# Patient Record
Sex: MALE | Race: WHITE | NOT HISPANIC OR LATINO | Employment: OTHER | URBAN - METROPOLITAN AREA
[De-identification: names, ages, dates, MRNs, and addresses within clinical notes are randomized per-mention and may not be internally consistent; named-entity substitution may affect disease eponyms.]

---

## 2017-01-04 ENCOUNTER — CLINICAL SUPPORT (OUTPATIENT)
Dept: FAMILY MEDICINE CLINIC | Facility: CLINIC | Age: 62
End: 2017-01-04

## 2017-01-04 DIAGNOSIS — Z51.6 DESENSITIZATION TO ALLERGENS: ICD-10-CM

## 2017-01-04 DIAGNOSIS — Z51.6 DESENSITIZATION TO ALLERGENS: Primary | ICD-10-CM

## 2017-01-04 PROCEDURE — 95117 IMMUNOTHERAPY INJECTIONS: CPT | Performed by: FAMILY MEDICINE

## 2017-01-19 ENCOUNTER — CLINICAL SUPPORT (OUTPATIENT)
Dept: FAMILY MEDICINE CLINIC | Facility: CLINIC | Age: 62
End: 2017-01-19

## 2017-01-19 DIAGNOSIS — Z51.6 NEED FOR DESENSITIZATION TO ALLERGENS: Primary | ICD-10-CM

## 2017-01-19 PROCEDURE — 95117 IMMUNOTHERAPY INJECTIONS: CPT | Performed by: FAMILY MEDICINE

## 2017-01-26 ENCOUNTER — CLINICAL SUPPORT (OUTPATIENT)
Dept: FAMILY MEDICINE CLINIC | Facility: CLINIC | Age: 62
End: 2017-01-26

## 2017-01-26 DIAGNOSIS — Z51.6 ALLERGY DESENSITIZATION THERAPY: ICD-10-CM

## 2017-01-26 DIAGNOSIS — Z51.6 ALLERGY DESENSITIZATION THERAPY: Primary | ICD-10-CM

## 2017-01-26 PROCEDURE — 95117 IMMUNOTHERAPY INJECTIONS: CPT | Performed by: FAMILY MEDICINE

## 2017-02-01 ENCOUNTER — TRANSCRIBE ORDERS (OUTPATIENT)
Dept: PULMONOLOGY | Facility: CLINIC | Age: 62
End: 2017-02-01

## 2017-02-01 DIAGNOSIS — R06.02 SOB (SHORTNESS OF BREATH): Primary | ICD-10-CM

## 2017-02-01 DIAGNOSIS — J84.10 PULMONARY FIBROSIS (HCC): Primary | ICD-10-CM

## 2017-02-07 ENCOUNTER — CLINICAL SUPPORT (OUTPATIENT)
Dept: FAMILY MEDICINE CLINIC | Facility: CLINIC | Age: 62
End: 2017-02-07

## 2017-02-07 DIAGNOSIS — Z51.6 DESENSITIZATION TO ALLERGENS: Primary | ICD-10-CM

## 2017-02-07 DIAGNOSIS — Z51.6 DESENSITIZATION TO ALLERGENS: ICD-10-CM

## 2017-02-07 PROCEDURE — 95117 IMMUNOTHERAPY INJECTIONS: CPT | Performed by: FAMILY MEDICINE

## 2017-02-14 ENCOUNTER — CLINICAL SUPPORT (OUTPATIENT)
Dept: FAMILY MEDICINE CLINIC | Facility: CLINIC | Age: 62
End: 2017-02-14

## 2017-02-14 DIAGNOSIS — Z51.6 NEED FOR DESENSITIZATION TO ALLERGENS: Primary | ICD-10-CM

## 2017-02-14 PROCEDURE — 95117 IMMUNOTHERAPY INJECTIONS: CPT | Performed by: FAMILY MEDICINE

## 2017-02-21 ENCOUNTER — CLINICAL SUPPORT (OUTPATIENT)
Dept: FAMILY MEDICINE CLINIC | Facility: CLINIC | Age: 62
End: 2017-02-21

## 2017-02-21 DIAGNOSIS — Z51.6 NEED FOR DESENSITIZATION TO ALLERGENS: Primary | ICD-10-CM

## 2017-02-21 PROCEDURE — 95117 IMMUNOTHERAPY INJECTIONS: CPT | Performed by: FAMILY MEDICINE

## 2017-02-27 PROBLEM — E55.9 VITAMIN D DEFICIENCY: Status: ACTIVE | Noted: 2017-02-27

## 2017-03-01 ENCOUNTER — CLINICAL SUPPORT (OUTPATIENT)
Dept: FAMILY MEDICINE CLINIC | Facility: CLINIC | Age: 62
End: 2017-03-01

## 2017-03-01 DIAGNOSIS — Z51.6 NEED FOR DESENSITIZATION TO ALLERGENS: Primary | ICD-10-CM

## 2017-03-01 PROCEDURE — 95117 IMMUNOTHERAPY INJECTIONS: CPT | Performed by: FAMILY MEDICINE

## 2017-03-07 ENCOUNTER — CLINICAL SUPPORT (OUTPATIENT)
Dept: FAMILY MEDICINE CLINIC | Facility: CLINIC | Age: 62
End: 2017-03-07

## 2017-03-07 DIAGNOSIS — Z51.6 NEED FOR DESENSITIZATION TO ALLERGENS: Primary | ICD-10-CM

## 2017-03-07 PROCEDURE — 95117 IMMUNOTHERAPY INJECTIONS: CPT | Performed by: FAMILY MEDICINE

## 2017-03-14 ENCOUNTER — HOSPITAL ENCOUNTER (OUTPATIENT)
Dept: GENERAL RADIOLOGY | Facility: HOSPITAL | Age: 62
Discharge: HOME | End: 2017-03-14
Attending: INTERNAL MEDICINE

## 2017-03-14 ENCOUNTER — TRANSCRIBE ORDERS (OUTPATIENT)
Dept: PULMONOLOGY | Facility: CLINIC | Age: 62
End: 2017-03-14

## 2017-03-14 ENCOUNTER — HOSPITAL ENCOUNTER (OUTPATIENT)
Dept: RESPIRATORY THERAPY | Facility: HOSPITAL | Age: 62
Discharge: HOME | End: 2017-03-14
Attending: INTERNAL MEDICINE | Admitting: INTERNAL MEDICINE

## 2017-03-14 ENCOUNTER — OFFICE VISIT (OUTPATIENT)
Dept: PULMONOLOGY | Facility: CLINIC | Age: 62
End: 2017-03-14

## 2017-03-14 VITALS
OXYGEN SATURATION: 97 % | WEIGHT: 271 LBS | HEART RATE: 88 BPM | BODY MASS INDEX: 32 KG/M2 | HEIGHT: 77 IN | SYSTOLIC BLOOD PRESSURE: 142 MMHG | TEMPERATURE: 97.9 F | DIASTOLIC BLOOD PRESSURE: 85 MMHG

## 2017-03-14 DIAGNOSIS — J84.112 IDIOPATHIC PULMONARY FIBROSIS (HCC): ICD-10-CM

## 2017-03-14 DIAGNOSIS — J84.10 PULMONARY FIBROSIS (HCC): ICD-10-CM

## 2017-03-14 DIAGNOSIS — J84.10 PULMONARY FIBROSIS (HCC): Primary | ICD-10-CM

## 2017-03-14 DIAGNOSIS — I73.00 RAYNAUD'S DISEASE WITHOUT GANGRENE: ICD-10-CM

## 2017-03-14 DIAGNOSIS — J84.112 IDIOPATHIC PULMONARY FIBROSIS (HCC): Primary | ICD-10-CM

## 2017-03-14 PROCEDURE — 94727 GAS DIL/WSHOT DETER LNG VOL: CPT | Performed by: INTERNAL MEDICINE

## 2017-03-14 PROCEDURE — 94727 GAS DIL/WSHOT DETER LNG VOL: CPT

## 2017-03-14 PROCEDURE — 94010 BREATHING CAPACITY TEST: CPT

## 2017-03-14 PROCEDURE — 71020 HC CHEST PA AND LATERAL: CPT

## 2017-03-14 PROCEDURE — 99213 OFFICE O/P EST LOW 20 MIN: CPT | Performed by: INTERNAL MEDICINE

## 2017-03-14 PROCEDURE — 94729 DIFFUSING CAPACITY: CPT

## 2017-03-14 PROCEDURE — 94010 BREATHING CAPACITY TEST: CPT | Performed by: INTERNAL MEDICINE

## 2017-03-14 PROCEDURE — 94729 DIFFUSING CAPACITY: CPT | Performed by: INTERNAL MEDICINE

## 2017-03-14 PROCEDURE — 71020 XR CHEST PA AND LATERAL: CPT | Performed by: RADIOLOGY

## 2017-03-14 NOTE — PROGRESS NOTES
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  He continues to work full-time and has some other minor medical problems of hypertension and acid reflux etc. being treated by his local primary care physicians      Review of Systems minimal shortness of breath and cough no expectoration chest pain or weight loss    Active Problems:  Problem List Items Addressed This Visit        Respiratory    Pulmonary fibrosis - Primary    Overview     Annotation: 04/03/2016 - Sees Dr. Kumar ([place]) - lung bx 2010; Prednisone until 9/15;                Past Medical History:  Past Medical History   Diagnosis Date   • Allergic rhinitis    • Asthma    • Elevated WBC count    • Hyperlipidemia    • Hypertension    • Idiopathic pulmonary fibrosis    • Palpitations    • Polyp of sigmoid colon    • Raynaud's disease    • Tinea cruris    • Type 2 diabetes mellitus        Family History:  Family History   Problem Relation Age of Onset   • Lymphoma Mother    • Coronary artery disease Father    • Diabetes Father    • Lung cancer Father        Social History:  Social History   Substance Use Topics   • Smoking status: Never Smoker   • Smokeless tobacco: Never Used   • Alcohol use 1.2 oz/week     2 Cans of beer per week      Comment: monthly?  "      Current Medications:  Current Outpatient Prescriptions   Medication Sig Dispense Refill   • Acetylcysteine (NAC PO) Take  by mouth daily.     • amLODIPine (NORVASC) 10 MG tablet Take 1 tablet by mouth daily. 90 tablet 3   • aspirin 81 MG tablet Take 1 tablet by mouth daily.     • Azelastine-Fluticasone 137-50 MCG/ACT suspension 1 spray into each nostril 2 (two) times a day.     • Cholecalciferol (VITAMIN D3) 5000 UNITS capsule capsule Take 5,000 Units by mouth daily.     • ibuprofen (ADVIL,MOTRIN) 200 MG tablet Take 2 tablets by mouth daily.     • losartan-hydrochlorothiazide (HYZAAR) 100-12.5 MG per tablet Take 1 tablet by mouth daily. 90 tablet 3   • Misc Natural Products (GLUCOSAMINE CHOND COMPLEX/MSM) tablet Take  by mouth.     • montelukast (SINGULAIR) 10 MG tablet Take 1 tablet by mouth daily. 90 tablet 3   • Multiple Vitamin (MULTI VITAMIN DAILY PO) Take 1 tablet by mouth daily. Kemar green     • Nintedanib Esylate 150 MG capsule Take 1 tablet by mouth 2 (two) times a day.     • Saw Palmetto extract      • simvastatin (ZOCOR) 20 MG tablet Take 1 tablet by mouth Every Night. 90 tablet 1   • Milk Thistle 175 MG capsule Take  by mouth.       No current facility-administered medications for this visit.        Allergies:  No Known Allergies    Vitals:  Visit Vitals   • /85 (BP Location: Left arm, Patient Position: Sitting, Cuff Size: Adult)   • Pulse 88   • Temp 97.9 °F (36.6 °C) (Oral)   • Ht 77\" (195.6 cm)   • Wt 271 lb (123 kg)   • SpO2 97%   • BMI 32.14 kg/m2       Physical Exam no acute distress vital signs is stable O2 sat 97% on room air blood pressure 140/85 positive findings are limited to clubbing of the fingers no cyanosis heart is regular and chest exam is significant for basal crackling rales at the end of inspiration that she had for several years    Imaging: Chest x-ray shows reduced lung volumes and some basal interstitial Infiltrate and changed over the years    PFT: FVC 36 FEV1 30 " total lung capacity 58 and diffusion capacity 78% similar to what she had in October and the numbers in July 2015 or 30 3/27/52 and 94% respectively  Results for orders placed or performed in visit on 11/17/16   Comprehensive Metabolic Panel   Result Value Ref Range    Glucose 97 70 - 100 mg/dL    BUN 20 9 - 23 mg/dL    Creatinine 0.90 0.60 - 1.30 mg/dL    eGFR Non African Am 86 >60 mL/min/1.73    eGFR African Am 105 >60 mL/min/1.73    BUN/Creatinine Ratio 22.2 7.0 - 25.0    Sodium 140 132 - 146 mmol/L    Potassium 4.7 3.5 - 5.5 mmol/L    Chloride 103 99 - 109 mmol/L    Total CO2 31.0 20.0 - 31.0 mmol/L    Calcium 9.5 8.7 - 10.4 mg/dL    Total Protein 7.3 5.7 - 8.2 g/dL    Albumin 4.40 3.20 - 4.80 g/dL    Globulin 2.9 gm/dL    A/G Ratio 1.5 g/dL    Total Bilirubin 0.7 0.3 - 1.2 mg/dL    Alkaline Phosphatase 73 25 - 100 U/L    AST (SGOT) 41 (H) 0 - 33 U/L    ALT (SGPT) 62 (H) 7 - 40 U/L   Lipid Panel   Result Value Ref Range    Total Cholesterol 199 0 - 200 mg/dL    Triglycerides 105 0 - 150 mg/dL    HDL Cholesterol 46 40 - 60 mg/dL    VLDL Cholesterol 21 mg/dL    LDL Cholesterol  132 (H) 0 - 100 mg/dL   Vitamin D 25 Hydroxy   Result Value Ref Range    25 Hydroxy, Vitamin D 37.3 ng/mL           ASSESSMENT AND DIAGNOSIS: Cirrhosis pulmonary fibrosis biopsy proven October 2009 remaining relatively stable based on symptoms and pulmonary function test  Recommendation continue current regimen return to us in 4 months with a complete PFT or earlier as needed      Follow-Up: 4 months with a complete PFT

## 2017-03-15 ENCOUNTER — CLINICAL SUPPORT (OUTPATIENT)
Dept: FAMILY MEDICINE CLINIC | Facility: CLINIC | Age: 62
End: 2017-03-15

## 2017-03-15 DIAGNOSIS — Z51.6 ALLERGY DESENSITIZATION THERAPY: ICD-10-CM

## 2017-03-15 DIAGNOSIS — Z51.6 ALLERGY DESENSITIZATION THERAPY: Primary | ICD-10-CM

## 2017-03-15 PROCEDURE — 95117 IMMUNOTHERAPY INJECTIONS: CPT | Performed by: FAMILY MEDICINE

## 2017-03-17 ENCOUNTER — APPOINTMENT (OUTPATIENT)
Dept: RESPIRATORY THERAPY | Facility: HOSPITAL | Age: 62
End: 2017-03-17
Attending: INTERNAL MEDICINE

## 2017-03-20 ENCOUNTER — CLINICAL SUPPORT (OUTPATIENT)
Dept: FAMILY MEDICINE CLINIC | Facility: CLINIC | Age: 62
End: 2017-03-20

## 2017-03-20 DIAGNOSIS — Z51.6 DESENSITIZATION TO ALLERGENS: Primary | ICD-10-CM

## 2017-03-20 DIAGNOSIS — Z51.6 DESENSITIZATION TO ALLERGENS: ICD-10-CM

## 2017-03-20 PROCEDURE — 95117 IMMUNOTHERAPY INJECTIONS: CPT | Performed by: FAMILY MEDICINE

## 2017-03-28 ENCOUNTER — CLINICAL SUPPORT (OUTPATIENT)
Dept: FAMILY MEDICINE CLINIC | Facility: CLINIC | Age: 62
End: 2017-03-28

## 2017-03-28 DIAGNOSIS — Z51.6 NEED FOR DESENSITIZATION TO ALLERGENS: Primary | ICD-10-CM

## 2017-03-28 PROCEDURE — 95117 IMMUNOTHERAPY INJECTIONS: CPT | Performed by: FAMILY MEDICINE

## 2017-04-04 ENCOUNTER — CLINICAL SUPPORT (OUTPATIENT)
Dept: FAMILY MEDICINE CLINIC | Facility: CLINIC | Age: 62
End: 2017-04-04

## 2017-04-04 DIAGNOSIS — Z51.6 NEED FOR DESENSITIZATION TO ALLERGENS: Primary | ICD-10-CM

## 2017-04-04 PROCEDURE — 95117 IMMUNOTHERAPY INJECTIONS: CPT | Performed by: FAMILY MEDICINE

## 2017-04-12 ENCOUNTER — CLINICAL SUPPORT (OUTPATIENT)
Dept: FAMILY MEDICINE CLINIC | Facility: CLINIC | Age: 62
End: 2017-04-12

## 2017-04-12 DIAGNOSIS — Z51.6 NEED FOR DESENSITIZATION TO ALLERGENS: Primary | ICD-10-CM

## 2017-04-12 DIAGNOSIS — Z51.6 NEED FOR DESENSITIZATION TO ALLERGENS: ICD-10-CM

## 2017-04-12 PROCEDURE — 95117 IMMUNOTHERAPY INJECTIONS: CPT | Performed by: FAMILY MEDICINE

## 2017-04-20 ENCOUNTER — CLINICAL SUPPORT (OUTPATIENT)
Dept: FAMILY MEDICINE CLINIC | Facility: CLINIC | Age: 62
End: 2017-04-20

## 2017-04-20 DIAGNOSIS — Z51.6 NEED FOR DESENSITIZATION TO ALLERGENS: Primary | ICD-10-CM

## 2017-04-20 PROCEDURE — 95117 IMMUNOTHERAPY INJECTIONS: CPT | Performed by: FAMILY MEDICINE

## 2017-04-26 ENCOUNTER — CLINICAL SUPPORT (OUTPATIENT)
Dept: FAMILY MEDICINE CLINIC | Facility: CLINIC | Age: 62
End: 2017-04-26

## 2017-04-26 DIAGNOSIS — R79.89 ELEVATED LFTS: ICD-10-CM

## 2017-04-26 DIAGNOSIS — Z51.6 DESENSITIZATION TO ALLERGENS: ICD-10-CM

## 2017-04-26 DIAGNOSIS — R79.89 ELEVATED LFTS: Primary | ICD-10-CM

## 2017-04-26 DIAGNOSIS — Z51.6 DESENSITIZATION TO ALLERGENS: Primary | ICD-10-CM

## 2017-04-26 DIAGNOSIS — E78.5 HYPERLIPIDEMIA, UNSPECIFIED HYPERLIPIDEMIA TYPE: ICD-10-CM

## 2017-04-26 LAB
ALBUMIN SERPL-MCNC: 4.2 G/DL (ref 3.2–4.8)
ALBUMIN/GLOB SERPL: 1.5 G/DL (ref 1.5–2.5)
ALP SERPL-CCNC: 66 U/L (ref 25–100)
ALT SERPL-CCNC: 51 U/L (ref 7–40)
AST SERPL-CCNC: 45 U/L (ref 0–33)
BILIRUB SERPL-MCNC: 0.5 MG/DL (ref 0.3–1.2)
BUN SERPL-MCNC: 18 MG/DL (ref 9–23)
BUN/CREAT SERPL: 22.5 (ref 7–25)
CALCIUM SERPL-MCNC: 9.6 MG/DL (ref 8.7–10.4)
CHLORIDE SERPL-SCNC: 99 MMOL/L (ref 99–109)
CHOLEST SERPL-MCNC: 150 MG/DL (ref 0–200)
CO2 SERPL-SCNC: 34 MMOL/L (ref 20–31)
CREAT SERPL-MCNC: 0.8 MG/DL (ref 0.6–1.3)
GLOBULIN SER CALC-MCNC: 2.8 GM/DL
GLUCOSE SERPL-MCNC: 101 MG/DL (ref 70–100)
HDLC SERPL-MCNC: 43 MG/DL (ref 40–60)
LDLC SERPL CALC-MCNC: 85 MG/DL (ref 0–100)
POTASSIUM SERPL-SCNC: 5.1 MMOL/L (ref 3.5–5.5)
PROT SERPL-MCNC: 7 G/DL (ref 5.7–8.2)
SODIUM SERPL-SCNC: 138 MMOL/L (ref 132–146)
TRIGL SERPL-MCNC: 110 MG/DL (ref 0–150)
VLDLC SERPL CALC-MCNC: 22 MG/DL

## 2017-04-26 PROCEDURE — 95117 IMMUNOTHERAPY INJECTIONS: CPT | Performed by: FAMILY MEDICINE

## 2017-04-27 LAB
HBA1C MFR BLD: NORMAL %
Lab: NORMAL
REQUEST PROBLEM: NORMAL
WRITTEN AUTHORIZATION: NORMAL

## 2017-05-04 ENCOUNTER — OFFICE VISIT (OUTPATIENT)
Dept: FAMILY MEDICINE CLINIC | Facility: CLINIC | Age: 62
End: 2017-05-04

## 2017-05-04 VITALS
DIASTOLIC BLOOD PRESSURE: 80 MMHG | RESPIRATION RATE: 16 BRPM | TEMPERATURE: 98.6 F | BODY MASS INDEX: 32 KG/M2 | HEART RATE: 68 BPM | SYSTOLIC BLOOD PRESSURE: 130 MMHG | WEIGHT: 271 LBS | HEIGHT: 77 IN

## 2017-05-04 DIAGNOSIS — E11.9 TYPE 2 DIABETES MELLITUS WITHOUT COMPLICATION, WITHOUT LONG-TERM CURRENT USE OF INSULIN (HCC): ICD-10-CM

## 2017-05-04 DIAGNOSIS — E78.5 HYPERLIPIDEMIA, UNSPECIFIED HYPERLIPIDEMIA TYPE: ICD-10-CM

## 2017-05-04 DIAGNOSIS — Z11.59 NEED FOR HEPATITIS C SCREENING TEST: ICD-10-CM

## 2017-05-04 DIAGNOSIS — E78.2 MIXED HYPERLIPIDEMIA: ICD-10-CM

## 2017-05-04 DIAGNOSIS — I10 ESSENTIAL HYPERTENSION: Primary | ICD-10-CM

## 2017-05-04 DIAGNOSIS — J84.10 PULMONARY FIBROSIS (HCC): ICD-10-CM

## 2017-05-04 DIAGNOSIS — Z51.6 ALLERGY DESENSITIZATION THERAPY: Primary | ICD-10-CM

## 2017-05-04 DIAGNOSIS — J30.1 ALLERGIC RHINITIS DUE TO POLLEN, UNSPECIFIED RHINITIS SEASONALITY: ICD-10-CM

## 2017-05-04 PROCEDURE — 99214 OFFICE O/P EST MOD 30 MIN: CPT | Performed by: FAMILY MEDICINE

## 2017-05-04 PROCEDURE — 95117 IMMUNOTHERAPY INJECTIONS: CPT | Performed by: FAMILY MEDICINE

## 2017-05-04 RX ORDER — AMLODIPINE BESYLATE 10 MG/1
10 TABLET ORAL DAILY
Qty: 90 TABLET | Refills: 3 | Status: SHIPPED | OUTPATIENT
Start: 2017-05-04 | End: 2017-11-06 | Stop reason: SDUPTHER

## 2017-05-04 RX ORDER — SIMVASTATIN 20 MG
20 TABLET ORAL NIGHTLY
Qty: 90 TABLET | Refills: 3 | Status: SHIPPED | OUTPATIENT
Start: 2017-05-04 | End: 2017-11-06 | Stop reason: SDUPTHER

## 2017-05-04 RX ORDER — MONTELUKAST SODIUM 10 MG/1
10 TABLET ORAL DAILY
Qty: 90 TABLET | Refills: 3 | Status: SHIPPED | OUTPATIENT
Start: 2017-05-04 | End: 2017-11-06 | Stop reason: SDUPTHER

## 2017-05-04 RX ORDER — LOSARTAN POTASSIUM AND HYDROCHLOROTHIAZIDE 12.5; 1 MG/1; MG/1
1 TABLET ORAL DAILY
Qty: 90 TABLET | Refills: 3 | Status: SHIPPED | OUTPATIENT
Start: 2017-05-04 | End: 2017-11-06 | Stop reason: SDUPTHER

## 2017-05-04 RX ORDER — AZELASTINE HYDROCHLORIDE, FLUTICASONE PROPIONATE 137; 50 UG/1; UG/1
1 SPRAY, METERED NASAL 2 TIMES DAILY
Qty: 23 G | Refills: 11 | Status: SHIPPED | OUTPATIENT
Start: 2017-05-04 | End: 2019-10-14 | Stop reason: ALTCHOICE

## 2017-05-05 LAB
HBA1C MFR BLD: 6.5 % (ref 4.8–5.6)
HCV AB S/CO SERPL IA: <0.1 S/CO RATIO (ref 0–0.9)
MICROALBUMIN UR-MCNC: <3 UG/ML

## 2017-05-12 ENCOUNTER — CLINICAL SUPPORT (OUTPATIENT)
Dept: FAMILY MEDICINE CLINIC | Facility: CLINIC | Age: 62
End: 2017-05-12

## 2017-05-12 DIAGNOSIS — Z51.6 NEED FOR DESENSITIZATION TO ALLERGENS: Primary | ICD-10-CM

## 2017-05-12 PROCEDURE — 95117 IMMUNOTHERAPY INJECTIONS: CPT | Performed by: FAMILY MEDICINE

## 2017-05-15 ENCOUNTER — TELEPHONE (OUTPATIENT)
Dept: FAMILY MEDICINE CLINIC | Facility: CLINIC | Age: 62
End: 2017-05-15

## 2017-05-15 RX ORDER — PERMETHRIN 50 MG/G
CREAM TOPICAL
Qty: 60 G | Refills: 0 | Status: SHIPPED | OUTPATIENT
Start: 2017-05-15 | End: 2017-11-06

## 2017-05-25 ENCOUNTER — CLINICAL SUPPORT (OUTPATIENT)
Dept: FAMILY MEDICINE CLINIC | Facility: CLINIC | Age: 62
End: 2017-05-25

## 2017-05-25 DIAGNOSIS — Z51.6 NEED FOR DESENSITIZATION TO ALLERGENS: Primary | ICD-10-CM

## 2017-05-25 PROCEDURE — 95117 IMMUNOTHERAPY INJECTIONS: CPT | Performed by: FAMILY MEDICINE

## 2017-06-01 ENCOUNTER — CLINICAL SUPPORT (OUTPATIENT)
Dept: FAMILY MEDICINE CLINIC | Facility: CLINIC | Age: 62
End: 2017-06-01

## 2017-06-01 DIAGNOSIS — Z51.6 NEED FOR DESENSITIZATION TO ALLERGENS: Primary | ICD-10-CM

## 2017-06-01 PROCEDURE — 95117 IMMUNOTHERAPY INJECTIONS: CPT | Performed by: FAMILY MEDICINE

## 2017-06-08 ENCOUNTER — CLINICAL SUPPORT (OUTPATIENT)
Dept: FAMILY MEDICINE CLINIC | Facility: CLINIC | Age: 62
End: 2017-06-08

## 2017-06-08 DIAGNOSIS — Z51.6 NEED FOR DESENSITIZATION TO ALLERGENS: Primary | ICD-10-CM

## 2017-06-08 PROCEDURE — 95117 IMMUNOTHERAPY INJECTIONS: CPT | Performed by: FAMILY MEDICINE

## 2017-06-20 ENCOUNTER — OFFICE VISIT (OUTPATIENT)
Dept: PULMONOLOGY | Facility: CLINIC | Age: 62
End: 2017-06-20

## 2017-06-20 VITALS
WEIGHT: 271 LBS | HEIGHT: 77 IN | SYSTOLIC BLOOD PRESSURE: 140 MMHG | HEART RATE: 82 BPM | DIASTOLIC BLOOD PRESSURE: 82 MMHG | TEMPERATURE: 97.7 F | OXYGEN SATURATION: 99 % | BODY MASS INDEX: 32 KG/M2

## 2017-06-20 DIAGNOSIS — E84.9 CYSTIC FIBROSIS (HCC): Primary | ICD-10-CM

## 2017-06-20 PROCEDURE — 99213 OFFICE O/P EST LOW 20 MIN: CPT | Performed by: INTERNAL MEDICINE

## 2017-06-20 NOTE — PROGRESS NOTES
History of Present Illness 61-year-old gentleman returning for follow-up of pulmonary fibrosis is was diagnosed with a biopsy October and was originally Treated with a tapering dose of prednisone and maintained on prednisone for several years and has been OFEV  for the last couple of years.  He continues to work full-time at [employer] and has minimal cough and some dyspnea on exertion.  He Recently Made an appointment for lung transplant evaluation at  however he underwent 6 minute walk and his O2 sat dropped to 83% he was told that he needs oxygen and she is here to get a second opinion            Review of Systems minimal cough and increasing shortness of breath on exertion he Pulse oxygen at home that he Uses and he has documented this oxygen drops to 84,85% with Exercise and self walks    Active Problems:  Problem List Items Addressed This Visit        Other    Cystic fibrosis - Primary          Past Medical History:  Past Medical History:   Diagnosis Date   • Allergic rhinitis    • Asthma    • Elevated WBC count    • Hyperlipidemia    • Hypertension    • Idiopathic pulmonary fibrosis    • Palpitations    • Polyp of sigmoid colon    • Raynaud's disease    • Tinea cruris    • Type 2 diabetes mellitus        Family History:  Family History   Problem Relation Age of Onset   • Lymphoma Mother    • Coronary artery disease Father    • Diabetes Father    • Lung cancer Father        Social History:  Social History   Substance Use Topics   • Smoking status: Never Smoker   • Smokeless tobacco: Never Used   • Alcohol use 1.2 oz/week     2 Cans of beer per week      Comment: monthly?       Current Medications:  Current Outpatient Prescriptions   Medication Sig Dispense Refill   • Acetylcysteine (NAC PO) Take  by mouth daily.     • amLODIPine (NORVASC) 10 MG tablet Take 1 tablet by mouth Daily. 90 tablet 3   • aspirin 81 MG tablet Take 1 tablet by mouth daily.     • Azelastine-Fluticasone 137-50  "MCG/ACT suspension 1 spray into each nostril 2 (Two) Times a Day. 23 g 11   • Cholecalciferol (VITAMIN D3) 5000 UNITS capsule capsule Take 5,000 Units by mouth daily.     • ibuprofen (ADVIL,MOTRIN) 200 MG tablet Take 2 tablets by mouth daily.     • losartan-hydrochlorothiazide (HYZAAR) 100-12.5 MG per tablet Take 1 tablet by mouth Daily. 90 tablet 3   • Misc Natural Products (GLUCOSAMINE CHOND COMPLEX/MSM) tablet Take  by mouth.     • montelukast (SINGULAIR) 10 MG tablet Take 1 tablet by mouth Daily. 90 tablet 3   • Multiple Vitamin (MULTI VITAMIN DAILY PO) Take 1 tablet by mouth daily. Kemar green     • Nintedanib Esylate 150 MG capsule Take 1 tablet by mouth 2 (two) times a day.     • permethrin (ACTICIN) 5 % cream Apply cream from head to feet, leave on for 8-14 hours, wash with soap/water, repeat if living mites present 14 days after initial treatment 60 g 0   • Saw Palmetto extract      • simvastatin (ZOCOR) 20 MG tablet Take 1 tablet by mouth Every Night. 90 tablet 3     No current facility-administered medications for this visit.        Allergies:  No Known Allergies    Vitals:  /82 (BP Location: Left arm, Patient Position: Sitting, Cuff Size: Adult)  Pulse 82  Temp 97.7 °F (36.5 °C) (Oral)   Ht 77\" (195.6 cm)  Wt 271 lb (123 kg)  SpO2 99%  BMI 32.14 kg/m2    Physical Exam no acute distress vital signs are stable O2 sat 98% at rest her 6 minute walk dropped to 83% he has obvious clubbing and minimal basilar rales 7 some tachycardia on exertion with heart rate of 120 and no edema    Imaging:    PFT: No PFT today Last one was done in March and showed some restrictive impairment and decreased diffusion capacity  Results for orders placed or performed in visit on 05/04/17   Hemoglobin A1c   Result Value Ref Range    Hemoglobin A1C 6.50 (H) 4.80 - 5.60 %   MicroAlbumin, Urine, Random   Result Value Ref Range    Microalbumin, Urine <3.0 Not Estab. ug/mL   Hepatitis C Antibody   Result Value Ref Range    " Hep C Virus Ab <0.1 0.0 - 0.9 s/co ratio           ASSESSMENT AND DIAGNOSIS:Pulmonary Fibrosis desaturation on ambulation  Recommendation wrote a prescription for oxygen generator to use at night to him to record his O2 sat at night as well certainly needs to 2 L of oxygen on ambulation and TRY TO TITRATE IT  NEXT VISIT AND AMBULATION AND MAY INCREASE THAT TO OXYGEN ACCORDINGLY.  He has an appointment in early July smoker back to [facility] transplant clinic.  Continue OFEV 150 mg twice a day      Follow-Up: Return in September and a copy of a PFT From the [facility]

## 2017-06-23 ENCOUNTER — CLINICAL SUPPORT (OUTPATIENT)
Dept: FAMILY MEDICINE CLINIC | Facility: CLINIC | Age: 62
End: 2017-06-23

## 2017-06-23 DIAGNOSIS — Z51.6 NEED FOR DESENSITIZATION TO ALLERGENS: Primary | ICD-10-CM

## 2017-06-23 PROCEDURE — 95117 IMMUNOTHERAPY INJECTIONS: CPT | Performed by: FAMILY MEDICINE

## 2017-06-30 ENCOUNTER — CLINICAL SUPPORT (OUTPATIENT)
Dept: FAMILY MEDICINE CLINIC | Facility: CLINIC | Age: 62
End: 2017-06-30

## 2017-06-30 DIAGNOSIS — Z51.6 NEED FOR DESENSITIZATION TO ALLERGENS: Primary | ICD-10-CM

## 2017-06-30 PROCEDURE — 95117 IMMUNOTHERAPY INJECTIONS: CPT | Performed by: FAMILY MEDICINE

## 2017-07-07 ENCOUNTER — CLINICAL SUPPORT (OUTPATIENT)
Dept: FAMILY MEDICINE CLINIC | Facility: CLINIC | Age: 62
End: 2017-07-07

## 2017-07-07 DIAGNOSIS — Z51.6 NEED FOR DESENSITIZATION TO ALLERGENS: ICD-10-CM

## 2017-07-07 PROCEDURE — 95117 IMMUNOTHERAPY INJECTIONS: CPT | Performed by: FAMILY MEDICINE

## 2017-07-13 ENCOUNTER — CLINICAL SUPPORT (OUTPATIENT)
Dept: FAMILY MEDICINE CLINIC | Facility: CLINIC | Age: 62
End: 2017-07-13

## 2017-07-13 DIAGNOSIS — Z51.6 NEED FOR DESENSITIZATION TO ALLERGENS: Primary | ICD-10-CM

## 2017-07-13 PROCEDURE — 95117 IMMUNOTHERAPY INJECTIONS: CPT | Performed by: FAMILY MEDICINE

## 2017-08-01 ENCOUNTER — OFFICE VISIT (OUTPATIENT)
Dept: PULMONOLOGY | Facility: CLINIC | Age: 62
End: 2017-08-01

## 2017-08-01 DIAGNOSIS — J84.10 PULMONARY FIBROSIS (HCC): Primary | ICD-10-CM

## 2017-08-01 PROCEDURE — 99213 OFFICE O/P EST LOW 20 MIN: CPT | Performed by: INTERNAL MEDICINE

## 2017-08-01 RX ORDER — LORATADINE 10 MG/1
CAPSULE, LIQUID FILLED ORAL
COMMUNITY

## 2017-08-01 NOTE — PROGRESS NOTES
History of Present Illness 61-year-old gentleman returning for follow-up of pulmonary fibrosis his diagnosis was made in January 2010 with a lung biopsy and he has been followed in our office since March 2007 originally treated with a tapering dose of prednisone and for the last 2 years has been onOFEV 150 mg twice a day he has continued working get to the toe planned in [facility] until about a few month ago and that his disability Was approved because of the decreasing Lung Function, has been seen at [facility] program for a couple of times last time being July 10 2017 and chest x-ray and PFT.  He uses oxygen on the treadmill once use oxygenokay at rest and at night      Review of Systems minimal cough and mild dyspnea on exertion    Active Problems:  Problem List Items Addressed This Visit        Respiratory    Pulmonary fibrosis - Primary    Overview     Annotation: 04/03/2016 - Sees Dr. Kumar ([place]) - lung bx 2010; Prednisone until 9/15;                Past Medical History:  Past Medical History:   Diagnosis Date   • Allergic rhinitis    • Asthma    • Elevated WBC count    • Hyperlipidemia    • Hypertension    • Idiopathic pulmonary fibrosis    • Palpitations    • Polyp of sigmoid colon    • Raynaud's disease    • Tinea cruris    • Type 2 diabetes mellitus        Family History:  Family History   Problem Relation Age of Onset   • Lymphoma Mother    • Coronary artery disease Father    • Diabetes Father    • Lung cancer Father        Social History:  Social History   Substance Use Topics   • Smoking status: Never Smoker   • Smokeless tobacco: Never Used   • Alcohol use 1.2 oz/week     2 Cans of beer per week      Comment: monthly?       Current Medications:  Current Outpatient Prescriptions   Medication Sig Dispense Refill   • Acetylcysteine (NAC PO) Take  by mouth daily.     • amLODIPine (NORVASC) 10 MG tablet Take 1 tablet by mouth Daily. 90 tablet 3   • aspirin 81 MG tablet Take 1 tablet by  mouth daily.     • Azelastine-Fluticasone 137-50 MCG/ACT suspension 1 spray into each nostril 2 (Two) Times a Day. 23 g 11   • Cholecalciferol (VITAMIN D3) 5000 UNITS capsule capsule Take 5,000 Units by mouth daily.     • ibuprofen (ADVIL,MOTRIN) 200 MG tablet Take 2 tablets by mouth daily.     • losartan-hydrochlorothiazide (HYZAAR) 100-12.5 MG per tablet Take 1 tablet by mouth Daily. 90 tablet 3   • Misc Natural Products (GLUCOSAMINE CHOND COMPLEX/MSM) tablet Take  by mouth.     • montelukast (SINGULAIR) 10 MG tablet Take 1 tablet by mouth Daily. 90 tablet 3   • Multiple Vitamin (MULTI VITAMIN DAILY PO) Take 1 tablet by mouth daily. Kemar green     • Nintedanib Esylate 150 MG capsule Take 1 tablet by mouth 2 (two) times a day.     • permethrin (ACTICIN) 5 % cream Apply cream from head to feet, leave on for 8-14 hours, wash with soap/water, repeat if living mites present 14 days after initial treatment 60 g 0   • Saw Palmetto extract      • simvastatin (ZOCOR) 20 MG tablet Take 1 tablet by mouth Every Night. 90 tablet 3     No current facility-administered medications for this visit.        Allergies:  No Known Allergies    Vitals:  There were no vitals taken for this visit.    Physical Exam no acute distress vital signs is stable blood pressure 138/86 O2 sat 95% rest and Sitting lungs continue to have some crackles at the bases particularly right lower lung    Imaging: Chest x-ray of July 10 17 at  shows small lung volumes and some increased lung marking at the bases similar to his x-rays of several years    PFT: Last PFT At  showed FVC of 40 FEV1 34 year see of 56 and JAJA indicating Moderate restrictive impairment and preserved diffusion similar to his numbers he had in March that were 30 6/30/58 and 78% respectively of 86%  Results for orders placed or performed in visit on 05/04/17   Hemoglobin A1c   Result Value Ref Range    Hemoglobin A1C 6.50 (H) 4.80 - 5.60 %   MicroAlbumin, Urine, Random   Result Value  Ref Range    Microalbumin, Urine <3.0 Not Estab. ug/mL   Hepatitis C Antibody   Result Value Ref Range    Hep C Virus Ab <0.1 0.0 - 0.9 s/co ratio           ASSESSMENT AND DIAGNOSIS:Pulmonary Fibrosis biopsy proven January 2010 moderate restrictive impairment that seems to be Relatively Stable however and progressing Over 70 years with mild desaturation on ambulation  Recommendation continue current regimen and follow-up at  every 3-4 months and call us as needed      Follow-Up: At  and may Call us or come for a second opinion as needed

## 2017-08-03 ENCOUNTER — CLINICAL SUPPORT (OUTPATIENT)
Dept: FAMILY MEDICINE CLINIC | Facility: CLINIC | Age: 62
End: 2017-08-03

## 2017-08-03 DIAGNOSIS — Z51.6 ALLERGY DESENSITIZATION THERAPY: ICD-10-CM

## 2017-08-03 DIAGNOSIS — Z51.6 ALLERGY DESENSITIZATION THERAPY: Primary | ICD-10-CM

## 2017-08-03 PROCEDURE — 95117 IMMUNOTHERAPY INJECTIONS: CPT | Performed by: FAMILY MEDICINE

## 2017-08-18 ENCOUNTER — CLINICAL SUPPORT (OUTPATIENT)
Dept: FAMILY MEDICINE CLINIC | Facility: CLINIC | Age: 62
End: 2017-08-18

## 2017-08-18 DIAGNOSIS — Z51.6 NEED FOR DESENSITIZATION TO ALLERGENS: Primary | ICD-10-CM

## 2017-08-18 PROCEDURE — 95117 IMMUNOTHERAPY INJECTIONS: CPT | Performed by: FAMILY MEDICINE

## 2017-08-30 ENCOUNTER — CLINICAL SUPPORT (OUTPATIENT)
Dept: FAMILY MEDICINE CLINIC | Facility: CLINIC | Age: 62
End: 2017-08-30

## 2017-08-30 DIAGNOSIS — Z51.6 DESENSITIZATION TO ALLERGENS: Primary | ICD-10-CM

## 2017-08-30 PROCEDURE — 95117 IMMUNOTHERAPY INJECTIONS: CPT | Performed by: FAMILY MEDICINE

## 2017-09-13 ENCOUNTER — CLINICAL SUPPORT (OUTPATIENT)
Dept: FAMILY MEDICINE CLINIC | Facility: CLINIC | Age: 62
End: 2017-09-13

## 2017-09-13 DIAGNOSIS — Z88.9 MULTIPLE ALLERGIES: Primary | ICD-10-CM

## 2017-09-13 PROCEDURE — 95117 IMMUNOTHERAPY INJECTIONS: CPT | Performed by: FAMILY MEDICINE

## 2017-09-28 ENCOUNTER — CLINICAL SUPPORT (OUTPATIENT)
Dept: FAMILY MEDICINE CLINIC | Facility: CLINIC | Age: 62
End: 2017-09-28

## 2017-09-28 DIAGNOSIS — Z51.6 ALLERGY DESENSITIZATION THERAPY: Primary | ICD-10-CM

## 2017-09-28 DIAGNOSIS — Z51.6 ALLERGY DESENSITIZATION THERAPY: ICD-10-CM

## 2017-09-28 PROCEDURE — 95117 IMMUNOTHERAPY INJECTIONS: CPT | Performed by: FAMILY MEDICINE

## 2017-10-13 ENCOUNTER — CLINICAL SUPPORT (OUTPATIENT)
Dept: FAMILY MEDICINE CLINIC | Facility: CLINIC | Age: 62
End: 2017-10-13

## 2017-10-13 DIAGNOSIS — Z51.6 DESENSITIZATION TO ALLERGENS: Primary | ICD-10-CM

## 2017-10-13 DIAGNOSIS — Z51.6 DESENSITIZATION TO ALLERGENS: ICD-10-CM

## 2017-10-13 PROCEDURE — 95117 IMMUNOTHERAPY INJECTIONS: CPT | Performed by: FAMILY MEDICINE

## 2017-10-26 ENCOUNTER — CLINICAL SUPPORT (OUTPATIENT)
Dept: FAMILY MEDICINE CLINIC | Facility: CLINIC | Age: 62
End: 2017-10-26

## 2017-10-26 DIAGNOSIS — Z88.9 MULTIPLE ALLERGIES: Primary | ICD-10-CM

## 2017-10-26 PROCEDURE — 95117 IMMUNOTHERAPY INJECTIONS: CPT | Performed by: FAMILY MEDICINE

## 2017-11-01 DIAGNOSIS — R73.9 HYPERGLYCEMIA: Primary | ICD-10-CM

## 2017-11-01 DIAGNOSIS — E78.5 HYPERLIPIDEMIA, UNSPECIFIED HYPERLIPIDEMIA TYPE: ICD-10-CM

## 2017-11-01 DIAGNOSIS — E55.9 VITAMIN D DEFICIENCY: ICD-10-CM

## 2017-11-01 LAB
25(OH)D3+25(OH)D2 SERPL-MCNC: 29.7 NG/ML
ALBUMIN SERPL-MCNC: 4.3 G/DL (ref 3.2–4.8)
ALBUMIN/GLOB SERPL: 1.7 G/DL (ref 1.5–2.5)
ALP SERPL-CCNC: 63 U/L (ref 25–100)
ALT SERPL-CCNC: 47 U/L (ref 7–40)
AST SERPL-CCNC: 32 U/L (ref 0–33)
BILIRUB SERPL-MCNC: 0.6 MG/DL (ref 0.3–1.2)
BUN SERPL-MCNC: 22 MG/DL (ref 9–23)
BUN/CREAT SERPL: 22 (ref 7–25)
CALCIUM SERPL-MCNC: 9.1 MG/DL (ref 8.7–10.4)
CHLORIDE SERPL-SCNC: 103 MMOL/L (ref 99–109)
CHOLEST SERPL-MCNC: 151 MG/DL (ref 0–200)
CO2 SERPL-SCNC: 35 MMOL/L (ref 20–31)
CREAT SERPL-MCNC: 1 MG/DL (ref 0.6–1.3)
GFR SERPLBLD CREATININE-BSD FMLA CKD-EPI: 76 ML/MIN/1.73
GFR SERPLBLD CREATININE-BSD FMLA CKD-EPI: 92 ML/MIN/1.73
GLOBULIN SER CALC-MCNC: 2.6 GM/DL
GLUCOSE SERPL-MCNC: 106 MG/DL (ref 70–100)
HBA1C MFR BLD: 6.1 % (ref 4.8–5.6)
HDLC SERPL-MCNC: 44 MG/DL (ref 40–60)
LDLC SERPL CALC-MCNC: 83 MG/DL (ref 0–100)
POTASSIUM SERPL-SCNC: 4.8 MMOL/L (ref 3.5–5.5)
PROT SERPL-MCNC: 6.9 G/DL (ref 5.7–8.2)
SODIUM SERPL-SCNC: 141 MMOL/L (ref 132–146)
TRIGL SERPL-MCNC: 118 MG/DL (ref 0–150)
VLDLC SERPL CALC-MCNC: 23.6 MG/DL

## 2017-11-06 ENCOUNTER — OFFICE VISIT (OUTPATIENT)
Dept: FAMILY MEDICINE CLINIC | Facility: CLINIC | Age: 62
End: 2017-11-06

## 2017-11-06 VITALS
HEIGHT: 77 IN | BODY MASS INDEX: 31.74 KG/M2 | WEIGHT: 268.8 LBS | HEART RATE: 76 BPM | RESPIRATION RATE: 16 BRPM | SYSTOLIC BLOOD PRESSURE: 140 MMHG | DIASTOLIC BLOOD PRESSURE: 80 MMHG | TEMPERATURE: 98.1 F

## 2017-11-06 DIAGNOSIS — E78.2 MIXED HYPERLIPIDEMIA: ICD-10-CM

## 2017-11-06 DIAGNOSIS — J84.10 PULMONARY FIBROSIS (HCC): ICD-10-CM

## 2017-11-06 DIAGNOSIS — I10 ESSENTIAL HYPERTENSION: ICD-10-CM

## 2017-11-06 DIAGNOSIS — N52.8 OTHER MALE ERECTILE DYSFUNCTION: ICD-10-CM

## 2017-11-06 DIAGNOSIS — E11.9 TYPE 2 DIABETES MELLITUS WITHOUT COMPLICATION, WITHOUT LONG-TERM CURRENT USE OF INSULIN (HCC): Primary | ICD-10-CM

## 2017-11-06 DIAGNOSIS — E78.5 HYPERLIPIDEMIA, UNSPECIFIED HYPERLIPIDEMIA TYPE: ICD-10-CM

## 2017-11-06 PROBLEM — E84.9 CYSTIC FIBROSIS: Status: RESOLVED | Noted: 2017-06-20 | Resolved: 2017-11-06

## 2017-11-06 PROCEDURE — 99214 OFFICE O/P EST MOD 30 MIN: CPT | Performed by: FAMILY MEDICINE

## 2017-11-06 RX ORDER — SIMVASTATIN 20 MG
20 TABLET ORAL NIGHTLY
Qty: 90 TABLET | Refills: 3 | Status: SHIPPED | OUTPATIENT
Start: 2017-11-06 | End: 2018-10-22 | Stop reason: SDUPTHER

## 2017-11-06 RX ORDER — TADALAFIL 20 MG/1
20 TABLET ORAL DAILY PRN
Qty: 8 TABLET | Refills: 5 | Status: SHIPPED | OUTPATIENT
Start: 2017-11-06 | End: 2019-10-14 | Stop reason: SDUPTHER

## 2017-11-06 RX ORDER — TURMERIC ROOT EXTRACT 500 MG
TABLET ORAL
COMMUNITY

## 2017-11-06 RX ORDER — LOSARTAN POTASSIUM AND HYDROCHLOROTHIAZIDE 12.5; 1 MG/1; MG/1
1 TABLET ORAL DAILY
Qty: 90 TABLET | Refills: 3 | Status: SHIPPED | OUTPATIENT
Start: 2017-11-06 | End: 2018-10-22 | Stop reason: SDUPTHER

## 2017-11-06 RX ORDER — MONTELUKAST SODIUM 10 MG/1
10 TABLET ORAL DAILY
Qty: 90 TABLET | Refills: 3 | Status: SHIPPED | OUTPATIENT
Start: 2017-11-06 | End: 2018-10-22 | Stop reason: SDUPTHER

## 2017-11-06 RX ORDER — AMLODIPINE BESYLATE 10 MG/1
10 TABLET ORAL DAILY
Qty: 90 TABLET | Refills: 3 | Status: SHIPPED | OUTPATIENT
Start: 2017-11-06 | End: 2018-10-22 | Stop reason: SDUPTHER

## 2017-11-06 NOTE — PROGRESS NOTES
Subjective    FU HTN, HLP, Vit D def, hyperglycemia, AR & pulmonary fibrosis followed by Dr. Carter.  RF: Amlodipine, Losartan/HCT, Singulair, Simvastatin #90 day supplies [facility].  Pt already had flu vaccine.    Ramirez Fernandez is a 62 y.o. male.     History of Present Illness     Patient returns today for follow-up of chronic medical conditions as noted above.      Since Ramirez's last visit He has not been to an Emergency Dept or Urgent Care facility.    He has had no problems with current medications.    Routine follow up today - labs already done last week.    Taking N-Acetyl-Cysteine for several years now with its possible benefit to his pulmonary fibrosis.  Smart Fat (Thrill On) diet; embraced some of the concepts.    Pulmonary fibrosis - history of STD use from work in past; has had a couple of walking tests at [facility] (/Dr. Carter); Lung volumes are staying constant.  Uses oxygen when he exercises on the treadmill.  Without it he'll desat down into the mid 80s with exercise within a minute or 2.  Applying for long-term disability this point but not sure how that's going to pan out with [employer].    ED - pretty significant overall; good/normal sexual desire.  Would be adjusted in trying a medication.    The following portions of the patient's history were reviewed and updated as appropriate: allergies, current medications, past medical history, past surgical history and problem list.    Review of Systems   Constitutional: Negative.  Negative for unexpected weight change.   HENT: Negative.    Respiratory: Positive for shortness of breath (but not very active). Negative for cough.    Cardiovascular: Negative.  Negative for chest pain.   Gastrointestinal: Negative for nausea.   Genitourinary: Negative for enuresis and penile pain. Genital sores: generally - O2 level will drop with activity.   Skin: Negative.  Negative for rash.   Neurological: Negative.  Negative for dizziness, tremors and headaches.    Psychiatric/Behavioral: Negative.        Objective   Physical Exam   Constitutional: He appears well-developed and well-nourished.   HENT:   Head: Normocephalic.   Eyes: Conjunctivae are normal.   Neck: Neck supple. No JVD present. Carotid bruit is not present.   Cardiovascular: Normal rate and regular rhythm.    No murmur heard.  Pulmonary/Chest: Effort normal and breath sounds normal.   Musculoskeletal: He exhibits no tenderness.    Ramirez had a diabetic foot exam performed today.  Lymphadenopathy:     He has no cervical adenopathy.   Neurological: He is alert.   Feet with normal sensation to light touch bilaterally   Skin: Skin is warm and dry.   Psychiatric: He has a normal mood and affect. His behavior is normal.   Nursing note and vitals reviewed.      Assessment/Plan   Ramirez was seen today for follow-up, hypertension, hyperlipidemia, vitamin d deficiency, hyperglycemia, allergic rhinitis and pulm fibrosis (dr. ramsey).    Diagnoses and all orders for this visit:    Type 2 diabetes mellitus without complication, without long-term current use of insulin  Comments:  Improved - great to see  Orders:  -     Ambulatory Referral to Ophthalmology    Essential hypertension  -     amLODIPine (NORVASC) 10 MG tablet; Take 1 tablet by mouth Daily.  -     losartan-hydrochlorothiazide (HYZAAR) 100-12.5 MG per tablet; Take 1 tablet by mouth Daily.    Mixed hyperlipidemia    Pulmonary fibrosis  Comments:  Continue with pulmonary follow up at  for now; great news that his lung volumes have not diminished    Essential hypertension  Comments:  Okay today, no changes  Orders:  -     amLODIPine (NORVASC) 10 MG tablet; Take 1 tablet by mouth Daily.  -     losartan-hydrochlorothiazide (HYZAAR) 100-12.5 MG per tablet; Take 1 tablet by mouth Daily.    Hyperlipidemia, unspecified hyperlipidemia type  -     simvastatin (ZOCOR) 20 MG tablet; Take 1 tablet by mouth Every Night.    Other male erectile dysfunction  -     tadalafil  (CIALIS) 20 MG tablet; Take 1 tablet by mouth Daily As Needed for erectile dysfunction.    Other orders  -     montelukast (SINGULAIR) 10 MG tablet; Take 1 tablet by mouth Daily.    Reviewed labs in detail with him.  Refilled meds as noted.  Nice improvement in his hemoglobin A1c.  Stable lipid profile.  Recheck with me in 6 months time.     continue pulmonary follow-up with [facility].

## 2017-11-14 ENCOUNTER — CLINICAL SUPPORT (OUTPATIENT)
Dept: FAMILY MEDICINE CLINIC | Facility: CLINIC | Age: 62
End: 2017-11-14

## 2017-11-14 DIAGNOSIS — Z51.6 DESENSITIZATION TO ALLERGENS: Primary | ICD-10-CM

## 2017-11-14 DIAGNOSIS — Z51.6 DESENSITIZATION TO ALLERGENS: ICD-10-CM

## 2017-11-14 PROCEDURE — 95117 IMMUNOTHERAPY INJECTIONS: CPT | Performed by: FAMILY MEDICINE

## 2017-11-28 ENCOUNTER — CLINICAL SUPPORT (OUTPATIENT)
Dept: FAMILY MEDICINE CLINIC | Facility: CLINIC | Age: 62
End: 2017-11-28

## 2017-11-28 DIAGNOSIS — Z51.6 DESENSITIZATION TO ALLERGENS: ICD-10-CM

## 2017-11-28 DIAGNOSIS — Z51.6 DESENSITIZATION TO ALLERGENS: Primary | ICD-10-CM

## 2017-11-28 PROCEDURE — 95117 IMMUNOTHERAPY INJECTIONS: CPT | Performed by: FAMILY MEDICINE

## 2018-04-02 ENCOUNTER — TELEPHONE (OUTPATIENT)
Dept: FAMILY MEDICINE CLINIC | Facility: CLINIC | Age: 63
End: 2018-04-02

## 2018-04-02 NOTE — TELEPHONE ENCOUNTER
----- Message from Linda Floyd sent at 4/2/2018  4:30 PM EDT -----  Contact: PAO; ORDER REQUEST   PT WIFE CALLED IN WANTING TO KNOW IF AN ORDER FOR INOGEN1 G4 OXYGEN CONCENTRATOR COULD BE WRITTEN FOR HER. SHE IS CURIOUS AS TO HOW MUCH  HIS INSURANCE WOULD COVER.     CALL BACK ON FILE   [phone]

## 2018-04-02 NOTE — TELEPHONE ENCOUNTER
LM for pt that you were out of town and that I would send this message to you, but I was fairly certain your answer would be, that this needed to come from his pulm, because they have all the proper documentation to get this covered and to be able to answer the questions for the insurance company.  Also we have no way of knowing what the insurance company would cover.

## 2018-04-24 ENCOUNTER — CLINICAL SUPPORT (OUTPATIENT)
Dept: FAMILY MEDICINE CLINIC | Facility: CLINIC | Age: 63
End: 2018-04-24

## 2018-04-24 DIAGNOSIS — Z51.6 DESENSITIZATION TO ALLERGENS: ICD-10-CM

## 2018-04-24 PROCEDURE — 95117 IMMUNOTHERAPY INJECTIONS: CPT | Performed by: FAMILY MEDICINE

## 2018-05-03 ENCOUNTER — CLINICAL SUPPORT (OUTPATIENT)
Dept: FAMILY MEDICINE CLINIC | Facility: CLINIC | Age: 63
End: 2018-05-03

## 2018-05-03 DIAGNOSIS — Z51.6 DESENSITIZATION TO ALLERGENS: ICD-10-CM

## 2018-05-03 PROCEDURE — 95117 IMMUNOTHERAPY INJECTIONS: CPT | Performed by: FAMILY MEDICINE

## 2018-05-09 ENCOUNTER — CLINICAL SUPPORT (OUTPATIENT)
Dept: FAMILY MEDICINE CLINIC | Facility: CLINIC | Age: 63
End: 2018-05-09

## 2018-05-09 DIAGNOSIS — Z51.6 DESENSITIZATION TO ALLERGENS: Primary | ICD-10-CM

## 2018-05-09 DIAGNOSIS — Z51.6 DESENSITIZATION TO ALLERGENS: ICD-10-CM

## 2018-05-09 PROCEDURE — 95117 IMMUNOTHERAPY INJECTIONS: CPT | Performed by: FAMILY MEDICINE

## 2018-05-16 ENCOUNTER — CLINICAL SUPPORT (OUTPATIENT)
Dept: FAMILY MEDICINE CLINIC | Facility: CLINIC | Age: 63
End: 2018-05-16

## 2018-05-16 DIAGNOSIS — Z51.6 ALLERGY DESENSITIZATION THERAPY: ICD-10-CM

## 2018-05-16 DIAGNOSIS — Z51.6 ALLERGY DESENSITIZATION THERAPY: Primary | ICD-10-CM

## 2018-05-16 PROCEDURE — 95117 IMMUNOTHERAPY INJECTIONS: CPT | Performed by: FAMILY MEDICINE

## 2018-05-23 ENCOUNTER — CLINICAL SUPPORT (OUTPATIENT)
Dept: FAMILY MEDICINE CLINIC | Facility: CLINIC | Age: 63
End: 2018-05-23

## 2018-05-23 DIAGNOSIS — Z51.6 ALLERGY DESENSITIZATION THERAPY: ICD-10-CM

## 2018-05-23 DIAGNOSIS — Z51.6 ALLERGY DESENSITIZATION THERAPY: Primary | ICD-10-CM

## 2018-05-23 PROCEDURE — 95117 IMMUNOTHERAPY INJECTIONS: CPT | Performed by: FAMILY MEDICINE

## 2018-05-30 ENCOUNTER — CLINICAL SUPPORT (OUTPATIENT)
Dept: FAMILY MEDICINE CLINIC | Facility: CLINIC | Age: 63
End: 2018-05-30

## 2018-05-30 DIAGNOSIS — Z88.9 MULTIPLE ALLERGIES: Primary | ICD-10-CM

## 2018-05-30 PROCEDURE — 95117 IMMUNOTHERAPY INJECTIONS: CPT | Performed by: FAMILY MEDICINE

## 2018-06-06 ENCOUNTER — CLINICAL SUPPORT (OUTPATIENT)
Dept: FAMILY MEDICINE CLINIC | Facility: CLINIC | Age: 63
End: 2018-06-06

## 2018-06-06 DIAGNOSIS — Z88.9 MULTIPLE ALLERGIES: Primary | ICD-10-CM

## 2018-06-06 PROCEDURE — 95117 IMMUNOTHERAPY INJECTIONS: CPT | Performed by: FAMILY MEDICINE

## 2018-06-14 ENCOUNTER — CLINICAL SUPPORT (OUTPATIENT)
Dept: FAMILY MEDICINE CLINIC | Facility: CLINIC | Age: 63
End: 2018-06-14

## 2018-06-14 DIAGNOSIS — Z51.6 NEED FOR DESENSITIZATION TO ALLERGENS: Primary | ICD-10-CM

## 2018-06-14 PROCEDURE — 95117 IMMUNOTHERAPY INJECTIONS: CPT | Performed by: FAMILY MEDICINE

## 2018-06-21 ENCOUNTER — CLINICAL SUPPORT (OUTPATIENT)
Dept: FAMILY MEDICINE CLINIC | Facility: CLINIC | Age: 63
End: 2018-06-21

## 2018-06-21 DIAGNOSIS — Z51.6 ALLERGY DESENSITIZATION THERAPY: Primary | ICD-10-CM

## 2018-06-21 DIAGNOSIS — Z51.6 ALLERGY DESENSITIZATION THERAPY: ICD-10-CM

## 2018-06-21 PROCEDURE — 95117 IMMUNOTHERAPY INJECTIONS: CPT | Performed by: FAMILY MEDICINE

## 2018-06-28 ENCOUNTER — CLINICAL SUPPORT (OUTPATIENT)
Dept: FAMILY MEDICINE CLINIC | Facility: CLINIC | Age: 63
End: 2018-06-28

## 2018-06-28 DIAGNOSIS — Z51.6 ALLERGY DESENSITIZATION THERAPY: ICD-10-CM

## 2018-06-28 DIAGNOSIS — Z51.6 ALLERGY DESENSITIZATION THERAPY: Primary | ICD-10-CM

## 2018-06-28 PROCEDURE — 95117 IMMUNOTHERAPY INJECTIONS: CPT | Performed by: FAMILY MEDICINE

## 2018-07-11 ENCOUNTER — CLINICAL SUPPORT (OUTPATIENT)
Dept: FAMILY MEDICINE CLINIC | Facility: CLINIC | Age: 63
End: 2018-07-11

## 2018-07-11 DIAGNOSIS — Z51.6 DESENSITIZATION TO ALLERGENS: Primary | ICD-10-CM

## 2018-07-11 DIAGNOSIS — Z51.6 DESENSITIZATION TO ALLERGENS: ICD-10-CM

## 2018-07-11 PROCEDURE — 95117 IMMUNOTHERAPY INJECTIONS: CPT | Performed by: FAMILY MEDICINE

## 2018-07-18 ENCOUNTER — CLINICAL SUPPORT (OUTPATIENT)
Dept: FAMILY MEDICINE CLINIC | Facility: CLINIC | Age: 63
End: 2018-07-18

## 2018-07-18 DIAGNOSIS — Z51.6 DESENSITIZATION TO ALLERGENS: Primary | ICD-10-CM

## 2018-07-18 PROCEDURE — 95117 IMMUNOTHERAPY INJECTIONS: CPT | Performed by: FAMILY MEDICINE

## 2018-07-26 ENCOUNTER — CLINICAL SUPPORT (OUTPATIENT)
Dept: FAMILY MEDICINE CLINIC | Facility: CLINIC | Age: 63
End: 2018-07-26

## 2018-07-26 DIAGNOSIS — Z51.6 NEED FOR DESENSITIZATION TO ALLERGENS: Primary | ICD-10-CM

## 2018-07-26 PROCEDURE — 95117 IMMUNOTHERAPY INJECTIONS: CPT | Performed by: FAMILY MEDICINE

## 2018-08-08 ENCOUNTER — CLINICAL SUPPORT (OUTPATIENT)
Dept: FAMILY MEDICINE CLINIC | Facility: CLINIC | Age: 63
End: 2018-08-08

## 2018-08-08 DIAGNOSIS — Z88.9 MULTIPLE ALLERGIES: Primary | ICD-10-CM

## 2018-08-08 PROCEDURE — 95117 IMMUNOTHERAPY INJECTIONS: CPT | Performed by: FAMILY MEDICINE

## 2018-08-15 ENCOUNTER — CLINICAL SUPPORT (OUTPATIENT)
Dept: FAMILY MEDICINE CLINIC | Facility: CLINIC | Age: 63
End: 2018-08-15

## 2018-08-15 DIAGNOSIS — Z51.6 DESENSITIZATION TO ALLERGENS: Primary | ICD-10-CM

## 2018-08-15 PROCEDURE — 95117 IMMUNOTHERAPY INJECTIONS: CPT | Performed by: FAMILY MEDICINE

## 2018-08-22 ENCOUNTER — CLINICAL SUPPORT (OUTPATIENT)
Dept: FAMILY MEDICINE CLINIC | Facility: CLINIC | Age: 63
End: 2018-08-22

## 2018-08-22 DIAGNOSIS — Z51.6 ALLERGY DESENSITIZATION THERAPY: Primary | ICD-10-CM

## 2018-08-22 DIAGNOSIS — Z51.6 ALLERGY DESENSITIZATION THERAPY: ICD-10-CM

## 2018-08-22 PROCEDURE — 95117 IMMUNOTHERAPY INJECTIONS: CPT | Performed by: FAMILY MEDICINE

## 2018-08-29 ENCOUNTER — CLINICAL SUPPORT (OUTPATIENT)
Dept: FAMILY MEDICINE CLINIC | Facility: CLINIC | Age: 63
End: 2018-08-29

## 2018-08-29 DIAGNOSIS — Z51.6 DESENSITIZATION TO ALLERGENS: ICD-10-CM

## 2018-08-29 DIAGNOSIS — Z51.6 DESENSITIZATION TO ALLERGENS: Primary | ICD-10-CM

## 2018-08-29 PROCEDURE — 95117 IMMUNOTHERAPY INJECTIONS: CPT | Performed by: FAMILY MEDICINE

## 2018-09-05 ENCOUNTER — CLINICAL SUPPORT (OUTPATIENT)
Dept: FAMILY MEDICINE CLINIC | Facility: CLINIC | Age: 63
End: 2018-09-05

## 2018-09-05 DIAGNOSIS — J45.909 ASTHMA, UNSPECIFIED ASTHMA SEVERITY, UNSPECIFIED WHETHER COMPLICATED, UNSPECIFIED WHETHER PERSISTENT: ICD-10-CM

## 2018-09-05 DIAGNOSIS — Z88.9 MULTIPLE ALLERGIES: Primary | ICD-10-CM

## 2018-09-05 PROCEDURE — 95117 IMMUNOTHERAPY INJECTIONS: CPT | Performed by: FAMILY MEDICINE

## 2018-09-12 ENCOUNTER — CLINICAL SUPPORT (OUTPATIENT)
Dept: FAMILY MEDICINE CLINIC | Facility: CLINIC | Age: 63
End: 2018-09-12

## 2018-09-12 DIAGNOSIS — Z51.6 ALLERGY DESENSITIZATION THERAPY: ICD-10-CM

## 2018-09-12 DIAGNOSIS — Z51.6 ALLERGY DESENSITIZATION THERAPY: Primary | ICD-10-CM

## 2018-09-12 PROBLEM — E55.9 VITAMIN D DEFICIENCY: Status: RESOLVED | Noted: 2017-02-27 | Resolved: 2018-09-12

## 2018-09-12 PROCEDURE — 95117 IMMUNOTHERAPY INJECTIONS: CPT | Performed by: FAMILY MEDICINE

## 2018-09-12 NOTE — PROGRESS NOTES
"Subjective   Ramirez Fernandez is a 62 y.o. male.     Chief Complaint   Patient presents with   • Follow-up     NOTE: pt is NOT fasting   • Diabetes   • Hypertension   • Hyperlipidemia   • Vitamin D Deficiency   • Allergic Rhinitis   • Pulmonary Fibrosis (Dr. Carter)   • Diarrhea     coming & going for the past year, but more frequent lately.      History of Present Illness     Returns to clinic after nearly a year with digestive complaints.    Also, long overdue to recheck on his diabetes that had improved at last visit (11/17); Hx of mild LFT increases that need follow up as well. Needs recheck on his lipids, too.    Seeing [facility] for his pulmonary fibrosis - they have sent no information to our office since last October.Seeing them again at the end of this month.    Pretty good otherwise - walk 3-4 times a week with oxygen;    Kids are doing well; wife still working full time;    Immunotherapy - back on for several months    Diarrhea / urgency for last year or so.  After lunch generally a normal BM, then will get a second BM with urgency /Diarrhea; Has been taking probiotics regularly    Once every 10 days, worse episode that tends to \"clean him out\"; he had gall bladder removed 1996;     The following portions of the patient's history were reviewed and updated as appropriate: allergies, current medications, past medical history, past social history and problem list.    Review of Systems   Constitutional: Negative for unexpected weight loss.   HENT: Negative.    Eyes: Negative for blurred vision.   Respiratory: Positive for shortness of breath (chronic).    Cardiovascular: Negative.  Negative for chest pain and leg swelling.   Gastrointestinal: Positive for diarrhea. Negative for blood in stool and constipation.   Endocrine: Negative for polydipsia and polyuria.   Skin: Negative.    Neurological: Negative.  Negative for dizziness.   Psychiatric/Behavioral: Negative.        Objective   Physical Exam   Constitutional: He " appears well-developed and well-nourished. No distress.   Eyes: Conjunctivae are normal. No scleral icterus.   Neck: Carotid bruit is not present.   Cardiovascular: Normal rate, regular rhythm and normal heart sounds.    No murmur heard.  Pulmonary/Chest: Effort normal and breath sounds normal. He has no wheezes.   Coarse breath sounds   Abdominal: Soft. Bowel sounds are normal. There is no tenderness.   Musculoskeletal: He exhibits no edema.    Ramirez had a diabetic foot exam performed today.  Skin Integrity  -  His right foot skin is intact.His left foot skin is intact..  Neurological: He is alert. He exhibits normal muscle tone.   Psychiatric: He has a normal mood and affect. Thought content normal.   Nursing note and vitals reviewed.    Assessment/Plan   Ramirez was seen today for follow-up, diabetes, hypertension, hyperlipidemia, vitamin d deficiency, allergic rhinitis, pulmonary fibrosis (dr. ramsey) and diarrhea.    Diagnoses and all orders for this visit:    Type 2 diabetes mellitus without complication, without long-term current use of insulin (CMS/[facility])  -     Hemoglobin A1c  -     MicroAlbumin, Urine, Random - Urine, Clean Catch    Essential hypertension  Comments:  BP not perfect today;     Mixed hyperlipidemia  -     Comprehensive Metabolic Panel  -     Lipid Panel With LDL / HDL Ratio    Allergic rhinitis due to pollen, unspecified chronicity, unspecified seasonality  Comments:  Continue immunotherapy    Pulmonary fibrosis (CMS/[facility])    Encounter for immunization  -     Pneumococcal Polysaccharide Vaccine 23-Valent Greater Than or Equal To 1yo Subcutaneous / IM    Diarrhea, unspecified type  -     Stool Culture - Stool, Per Rectum; Future  -     Clostridium Difficile EIA - Stool, Per Rectum  -     Gastrointestinal Panel, PCR - Stool, Per Rectum; Future  -     Ova & Parasite Examination - Stool, Per Rectum; Future    Other orders  -     colestipol (COLESTID) 1 g tablet; Take 1 tablet by mouth 2 (Two) Times  a Day As Needed (diarrhea).    Watch BP - to check at next visit and bring values with him.  Has not been checking at home but will.

## 2018-09-17 ENCOUNTER — OFFICE VISIT (OUTPATIENT)
Dept: FAMILY MEDICINE CLINIC | Facility: CLINIC | Age: 63
End: 2018-09-17

## 2018-09-17 VITALS
HEIGHT: 77 IN | WEIGHT: 267.4 LBS | TEMPERATURE: 97.6 F | BODY MASS INDEX: 31.57 KG/M2 | RESPIRATION RATE: 16 BRPM | HEART RATE: 76 BPM | SYSTOLIC BLOOD PRESSURE: 145 MMHG | DIASTOLIC BLOOD PRESSURE: 90 MMHG

## 2018-09-17 DIAGNOSIS — R19.7 DIARRHEA, UNSPECIFIED TYPE: ICD-10-CM

## 2018-09-17 DIAGNOSIS — E78.2 MIXED HYPERLIPIDEMIA: ICD-10-CM

## 2018-09-17 DIAGNOSIS — J84.10 PULMONARY FIBROSIS (HCC): ICD-10-CM

## 2018-09-17 DIAGNOSIS — J30.1 ALLERGIC RHINITIS DUE TO POLLEN, UNSPECIFIED CHRONICITY, UNSPECIFIED SEASONALITY: ICD-10-CM

## 2018-09-17 DIAGNOSIS — Z23 ENCOUNTER FOR IMMUNIZATION: ICD-10-CM

## 2018-09-17 DIAGNOSIS — E11.9 TYPE 2 DIABETES MELLITUS WITHOUT COMPLICATION, WITHOUT LONG-TERM CURRENT USE OF INSULIN (HCC): Primary | ICD-10-CM

## 2018-09-17 DIAGNOSIS — I10 ESSENTIAL HYPERTENSION: ICD-10-CM

## 2018-09-17 PROCEDURE — 90471 IMMUNIZATION ADMIN: CPT | Performed by: FAMILY MEDICINE

## 2018-09-17 PROCEDURE — 90732 PPSV23 VACC 2 YRS+ SUBQ/IM: CPT | Performed by: FAMILY MEDICINE

## 2018-09-17 PROCEDURE — 99214 OFFICE O/P EST MOD 30 MIN: CPT | Performed by: FAMILY MEDICINE

## 2018-09-17 RX ORDER — MONTELUKAST SODIUM 4 MG/1
1 TABLET, CHEWABLE ORAL 2 TIMES DAILY PRN
Qty: 60 TABLET | Refills: 3 | Status: SHIPPED | OUTPATIENT
Start: 2018-09-17 | End: 2019-01-21

## 2018-09-18 LAB
ALBUMIN SERPL-MCNC: 4.39 G/DL (ref 3.2–4.8)
ALBUMIN/GLOB SERPL: 1.9 G/DL (ref 1.5–2.5)
ALP SERPL-CCNC: 68 U/L (ref 25–100)
ALT SERPL-CCNC: 79 U/L (ref 7–40)
AST SERPL-CCNC: 50 U/L (ref 0–33)
BILIRUB SERPL-MCNC: 0.5 MG/DL (ref 0.3–1.2)
BUN SERPL-MCNC: 16 MG/DL (ref 9–23)
BUN/CREAT SERPL: 15.8 (ref 7–25)
CALCIUM SERPL-MCNC: 9.3 MG/DL (ref 8.7–10.4)
CHLORIDE SERPL-SCNC: 104 MMOL/L (ref 99–109)
CHOLEST SERPL-MCNC: 167 MG/DL (ref 0–200)
CO2 SERPL-SCNC: 29 MMOL/L (ref 20–31)
CREAT SERPL-MCNC: 1.01 MG/DL (ref 0.6–1.3)
GLOBULIN SER CALC-MCNC: 2.3 GM/DL
GLUCOSE SERPL-MCNC: 112 MG/DL (ref 70–100)
HBA1C MFR BLD: 6.8 % (ref 4.8–5.6)
HDLC SERPL-MCNC: 45 MG/DL (ref 40–60)
LDLC SERPL CALC-MCNC: 95 MG/DL (ref 0–100)
LDLC/HDLC SERPL: 2.11 {RATIO}
MICROALBUMIN UR-MCNC: 3.1 UG/ML
POTASSIUM SERPL-SCNC: 4.9 MMOL/L (ref 3.5–5.5)
PROT SERPL-MCNC: 6.7 G/DL (ref 5.7–8.2)
SODIUM SERPL-SCNC: 142 MMOL/L (ref 132–146)
TRIGL SERPL-MCNC: 136 MG/DL (ref 0–150)
VLDLC SERPL CALC-MCNC: 27.2 MG/DL

## 2018-09-19 LAB — C DIFF TOX A+B STL QL IA: NEGATIVE

## 2018-09-20 ENCOUNTER — CLINICAL SUPPORT (OUTPATIENT)
Dept: FAMILY MEDICINE CLINIC | Facility: CLINIC | Age: 63
End: 2018-09-20

## 2018-09-20 DIAGNOSIS — Z51.6 DESENSITIZATION TO ALLERGENS: ICD-10-CM

## 2018-09-20 PROCEDURE — 95117 IMMUNOTHERAPY INJECTIONS: CPT | Performed by: FAMILY MEDICINE

## 2018-09-26 ENCOUNTER — CLINICAL SUPPORT (OUTPATIENT)
Dept: FAMILY MEDICINE CLINIC | Facility: CLINIC | Age: 63
End: 2018-09-26

## 2018-09-26 DIAGNOSIS — Z51.6 ALLERGY DESENSITIZATION THERAPY: ICD-10-CM

## 2018-09-26 DIAGNOSIS — Z51.6 ALLERGY DESENSITIZATION THERAPY: Primary | ICD-10-CM

## 2018-09-26 PROCEDURE — 95117 IMMUNOTHERAPY INJECTIONS: CPT | Performed by: FAMILY MEDICINE

## 2018-10-03 ENCOUNTER — FLU SHOT (OUTPATIENT)
Dept: FAMILY MEDICINE CLINIC | Facility: CLINIC | Age: 63
End: 2018-10-03

## 2018-10-03 ENCOUNTER — CLINICAL SUPPORT (OUTPATIENT)
Dept: FAMILY MEDICINE CLINIC | Facility: CLINIC | Age: 63
End: 2018-10-03

## 2018-10-03 ENCOUNTER — TELEPHONE (OUTPATIENT)
Dept: FAMILY MEDICINE CLINIC | Facility: CLINIC | Age: 63
End: 2018-10-03

## 2018-10-03 DIAGNOSIS — Z51.6 ALLERGY DESENSITIZATION THERAPY: Primary | ICD-10-CM

## 2018-10-03 DIAGNOSIS — Z51.6 ALLERGY DESENSITIZATION THERAPY: ICD-10-CM

## 2018-10-03 PROCEDURE — 90662 IIV NO PRSV INCREASED AG IM: CPT | Performed by: FAMILY MEDICINE

## 2018-10-03 PROCEDURE — 95117 IMMUNOTHERAPY INJECTIONS: CPT | Performed by: FAMILY MEDICINE

## 2018-10-03 PROCEDURE — 90471 IMMUNIZATION ADMIN: CPT | Performed by: FAMILY MEDICINE

## 2018-10-03 NOTE — TELEPHONE ENCOUNTER
"Pt say's, he discussed with you at has last visit about getting a flu vaccine. He is only 62 but you discussed that he could get the \"high-dose\" due to his lung condition. Is this correct?  "

## 2018-10-11 ENCOUNTER — CLINICAL SUPPORT (OUTPATIENT)
Dept: FAMILY MEDICINE CLINIC | Facility: CLINIC | Age: 63
End: 2018-10-11

## 2018-10-11 DIAGNOSIS — Z88.7: Primary | ICD-10-CM

## 2018-10-11 DIAGNOSIS — Z51.6 ALLERGY DESENSITIZATION THERAPY: ICD-10-CM

## 2018-10-11 PROCEDURE — 95117 IMMUNOTHERAPY INJECTIONS: CPT | Performed by: FAMILY MEDICINE

## 2018-10-17 ENCOUNTER — CLINICAL SUPPORT (OUTPATIENT)
Dept: FAMILY MEDICINE CLINIC | Facility: CLINIC | Age: 63
End: 2018-10-17

## 2018-10-17 DIAGNOSIS — Z88.9 MULTIPLE ALLERGIES: Primary | ICD-10-CM

## 2018-10-17 PROCEDURE — 95117 IMMUNOTHERAPY INJECTIONS: CPT | Performed by: FAMILY MEDICINE

## 2018-10-22 DIAGNOSIS — E78.5 HYPERLIPIDEMIA, UNSPECIFIED HYPERLIPIDEMIA TYPE: ICD-10-CM

## 2018-10-22 DIAGNOSIS — I10 ESSENTIAL HYPERTENSION: ICD-10-CM

## 2018-10-22 RX ORDER — AMLODIPINE BESYLATE 10 MG/1
TABLET ORAL
Qty: 90 TABLET | Refills: 1 | Status: SHIPPED | OUTPATIENT
Start: 2018-10-22 | End: 2019-04-17 | Stop reason: SDUPTHER

## 2018-10-22 RX ORDER — LOSARTAN POTASSIUM AND HYDROCHLOROTHIAZIDE 12.5; 1 MG/1; MG/1
TABLET ORAL
Qty: 90 TABLET | Refills: 1 | Status: SHIPPED | OUTPATIENT
Start: 2018-10-22 | End: 2019-05-20 | Stop reason: SDUPTHER

## 2018-10-22 RX ORDER — SIMVASTATIN 20 MG
TABLET ORAL
Qty: 90 TABLET | Refills: 1 | Status: SHIPPED | OUTPATIENT
Start: 2018-10-22 | End: 2019-10-13 | Stop reason: SDUPTHER

## 2018-10-22 RX ORDER — MONTELUKAST SODIUM 10 MG/1
TABLET ORAL
Qty: 90 TABLET | Refills: 1 | Status: SHIPPED | OUTPATIENT
Start: 2018-10-22 | End: 2019-10-14 | Stop reason: ALTCHOICE

## 2018-10-24 ENCOUNTER — CLINICAL SUPPORT (OUTPATIENT)
Dept: FAMILY MEDICINE CLINIC | Facility: CLINIC | Age: 63
End: 2018-10-24

## 2018-10-24 DIAGNOSIS — Z88.9 MULTIPLE ALLERGIES: Primary | ICD-10-CM

## 2018-10-24 PROCEDURE — 95117 IMMUNOTHERAPY INJECTIONS: CPT | Performed by: FAMILY MEDICINE

## 2018-10-31 ENCOUNTER — CLINICAL SUPPORT (OUTPATIENT)
Dept: FAMILY MEDICINE CLINIC | Facility: CLINIC | Age: 63
End: 2018-10-31

## 2018-10-31 DIAGNOSIS — Z88.9 MULTIPLE ALLERGIES: Primary | ICD-10-CM

## 2018-10-31 PROCEDURE — 95117 IMMUNOTHERAPY INJECTIONS: CPT | Performed by: FAMILY MEDICINE

## 2018-11-07 ENCOUNTER — CLINICAL SUPPORT (OUTPATIENT)
Dept: FAMILY MEDICINE CLINIC | Facility: CLINIC | Age: 63
End: 2018-11-07

## 2018-11-07 DIAGNOSIS — Z88.9 MULTIPLE ALLERGIES: Primary | ICD-10-CM

## 2018-11-07 PROCEDURE — 95117 IMMUNOTHERAPY INJECTIONS: CPT | Performed by: FAMILY MEDICINE

## 2018-11-14 ENCOUNTER — CLINICAL SUPPORT (OUTPATIENT)
Dept: FAMILY MEDICINE CLINIC | Facility: CLINIC | Age: 63
End: 2018-11-14

## 2018-11-14 DIAGNOSIS — Z51.6 NEED FOR DESENSITIZATION TO ALLERGENS: ICD-10-CM

## 2018-11-14 DIAGNOSIS — Z51.6 NEED FOR DESENSITIZATION TO ALLERGENS: Primary | ICD-10-CM

## 2018-11-14 PROCEDURE — 95117 IMMUNOTHERAPY INJECTIONS: CPT | Performed by: FAMILY MEDICINE

## 2018-11-20 ENCOUNTER — CLINICAL SUPPORT (OUTPATIENT)
Dept: FAMILY MEDICINE CLINIC | Facility: CLINIC | Age: 63
End: 2018-11-20

## 2018-11-20 DIAGNOSIS — Z51.6 DESENSITIZATION TO ALLERGENS: Primary | ICD-10-CM

## 2018-11-20 PROCEDURE — 95117 IMMUNOTHERAPY INJECTIONS: CPT | Performed by: FAMILY MEDICINE

## 2018-12-11 ENCOUNTER — CLINICAL SUPPORT (OUTPATIENT)
Dept: FAMILY MEDICINE CLINIC | Facility: CLINIC | Age: 63
End: 2018-12-11

## 2018-12-11 DIAGNOSIS — Z51.6 DESENSITIZATION TO ALLERGENS: Primary | ICD-10-CM

## 2018-12-11 PROCEDURE — 95117 IMMUNOTHERAPY INJECTIONS: CPT | Performed by: FAMILY MEDICINE

## 2018-12-20 ENCOUNTER — CLINICAL SUPPORT (OUTPATIENT)
Dept: FAMILY MEDICINE CLINIC | Facility: CLINIC | Age: 63
End: 2018-12-20

## 2018-12-20 DIAGNOSIS — Z51.6 DESENSITIZATION TO ALLERGENS: ICD-10-CM

## 2018-12-20 PROCEDURE — 95117 IMMUNOTHERAPY INJECTIONS: CPT | Performed by: FAMILY MEDICINE

## 2018-12-31 ENCOUNTER — CLINICAL SUPPORT (OUTPATIENT)
Dept: FAMILY MEDICINE CLINIC | Facility: CLINIC | Age: 63
End: 2018-12-31

## 2018-12-31 DIAGNOSIS — Z51.6 ALLERGY DESENSITIZATION THERAPY: Primary | ICD-10-CM

## 2018-12-31 DIAGNOSIS — Z51.6 ALLERGY DESENSITIZATION THERAPY: ICD-10-CM

## 2018-12-31 PROCEDURE — 95117 IMMUNOTHERAPY INJECTIONS: CPT | Performed by: FAMILY MEDICINE

## 2019-01-02 ENCOUNTER — TELEPHONE (OUTPATIENT)
Dept: FAMILY MEDICINE CLINIC | Facility: CLINIC | Age: 64
End: 2019-01-02

## 2019-01-02 DIAGNOSIS — J84.10 PULMONARY FIBROSIS (HCC): Primary | ICD-10-CM

## 2019-01-02 NOTE — TELEPHONE ENCOUNTER
----- Message from Linda Floyd sent at 1/2/2019  2:39 PM EST -----  Contact: PAO; PT CALL BACK   PT WOULD LIKE A CALL BACK ABOUT A NEW PRESCRIPTION. IT IS NOT A RX THAT PAO HAS NO PRESCRIBED TO HIM IN THE PAST           CALL BACK   [phone]

## 2019-01-02 NOTE — TELEPHONE ENCOUNTER
Pt is aware you are out until Monday.      His Pulm, Dr. Carter (that he only see's once a year for pulm fibrosis) is retiring. He has always Rx'd the Ofev for him through a speciality pharmacy.  When he went to request it, they would not fill the Rx anymore because he was due for an appt but they are not scheduling anymore.  He also see's [facility] but they will not write it because they said, they are over his testing but not prescribing of his meds.     He needs to know if they fax it to you, will you okay the refills? Or will he need to see another pulm.?

## 2019-01-03 NOTE — TELEPHONE ENCOUNTER
I think he will need a pulmonary specialist. Disappointed  is not taking responsibility for this. The problem is likely to come into play when a PA is needed and I won't be following or have info needed to make Rx changes, etc. I'll make the referral to a [facility] specialist. Is he out of the medicine currently?

## 2019-01-04 NOTE — TELEPHONE ENCOUNTER
FYI:  informed pt that we would have to make a referral for this. He is out of meds currently and I informed him he needed to contact Dr. Carter's office back to let them know he is completely out of meds and that he needs a refill to at least hold him over until he can get into the new Pulm.

## 2019-01-04 NOTE — TELEPHONE ENCOUNTER
Pt will check back w/ Dr. Carter's office. Speciality pharm is now supposedly going to send him a temporary shipment.

## 2019-01-07 ENCOUNTER — TELEPHONE (OUTPATIENT)
Dept: FAMILY MEDICINE CLINIC | Facility: CLINIC | Age: 64
End: 2019-01-07

## 2019-01-09 ENCOUNTER — TELEPHONE (OUTPATIENT)
Dept: FAMILY MEDICINE CLINIC | Facility: CLINIC | Age: 64
End: 2019-01-09

## 2019-01-09 NOTE — TELEPHONE ENCOUNTER
Crystal, is this an automatic thing that is canceling the orders? If so, please let Dr. Cruz know, so he can take this up with his team.

## 2019-01-09 NOTE — TELEPHONE ENCOUNTER
These orders need to remain and not be cancelled. Patient is coming in prior to his 1/21/19 appointment to have blood work.

## 2019-01-09 NOTE — TELEPHONE ENCOUNTER
"----- Message from User, System Default sent at 1/9/2019  3:55 AM EST -----  Regarding: Cancellation of Order # [id number]  Order number [id number] for the procedure HEMOGLOBIN A1C [LAB90]   has been canceled by User, System Default [SYSUSER]. This   procedure was ordered by you on Dec 6, 2018 for the patient   Ramirez Fernandez [[id number]]. The reason for cancellation was   \"None\".  "

## 2019-01-10 NOTE — TELEPHONE ENCOUNTER
These were cancelled by the system. If it was pre-charted before the appointment the patient cancelled in December that may be the reason they were cancelled.

## 2019-01-14 NOTE — PROGRESS NOTES
Subjective   aRmirez Fernandez is a 63 y.o. male.     History of Present Illness     Patient returns today for follow-up of chronic medical conditions as noted above.      Had asked us to fill his pulmonary fibrosis meds as Dr. Carter was retiring soon.  (has been seeing lately for pulmonary issues) would not fill for him. I relayed I was not comfortable not knowing the things to look out for with its use and with the potential need for a PA, he needed to be with pulmonary to Rx appropriately. Dr. Carter's office said they would continue to Rx.    Due for lab f/u; Comments from labs last visit:  Mild increase in LFTs as we have seen before - slightly higher than last check; His liver function / inflammation may well be due to some worsening of his blood sugars - we'll hold off on medication at this point, but really needs some changes to diet; Lower his carbs to 50 grams a day or less, increase unsaturated fats and protein - we'll recheck in December as scheduled;  Kidney function is okay    [facility] - continues seeing them    BP at home - not checking  Elevated again here.    Lab Results   Component Value Date    HGBA1C 6.30 (H) 01/17/2019     Lab Results   Component Value Date    CHLPL 170 01/17/2019    CHLPL 167 09/17/2018    CHLPL 151 11/01/2017     Lab Results   Component Value Date    TRIG 128 01/17/2019    TRIG 136 09/17/2018    TRIG 118 11/01/2017     Lab Results   Component Value Date    HDL 41 01/17/2019    HDL 45 09/17/2018    HDL 44 11/01/2017     Lab Results   Component Value Date     (H) 01/17/2019    LDL 95 09/17/2018    LDL 83 11/01/2017       The following portions of the patient's history were reviewed and updated as appropriate: allergies, current medications, past medical history, past social history and problem list.    Review of Systems   Constitutional: Negative for unexpected weight loss.   HENT: Negative.    Eyes: Negative for blurred vision.   Respiratory: Positive for shortness of  breath (chronic).    Cardiovascular: Negative.  Negative for chest pain and leg swelling.   Gastrointestinal: Positive for diarrhea. Negative for blood in stool and constipation.   Endocrine: Negative for polydipsia and polyuria.   Skin: Negative.    Neurological: Negative.  Negative for dizziness.   Psychiatric/Behavioral: Negative.        Objective   Physical Exam   Constitutional: He appears well-developed and well-nourished. No distress.   Eyes: Conjunctivae are normal. No scleral icterus.   Neck: Carotid bruit is not present.   Cardiovascular: Normal rate, regular rhythm and normal heart sounds.   No murmur heard.  Pulmonary/Chest: Effort normal and breath sounds normal. He has no wheezes.   Coarse breath sounds   Musculoskeletal: He exhibits no edema.     Skin Integrity  -  His right foot skin is intact.His left foot skin is intact..  Neurological: He is alert.   Psychiatric: He has a normal mood and affect. Thought content normal.   Nursing note and vitals reviewed.        Assessment/Plan   Ramirez was seen today for follow-up, diabetes, hypertension, hyperlipidemia, vitamin d deficiency, elevated lft's, allergic rhinitis and pulm fibrosis.    Diagnoses and all orders for this visit:    Type 2 diabetes mellitus without complication, without long-term current use of insulin (CMS/[facility])    Mixed hyperlipidemia    Essential hypertension    Pulmonary fibrosis (CMS/[facility])    Other orders  -     Cancel: Comprehensive Metabolic Panel  -     Cancel: Lipid Panel With LDL / HDL Ratio  -     Cancel: Hemoglobin A1c    To send me his BP numbers in next few weeks for us to make decision on BP med change

## 2019-01-17 ENCOUNTER — CLINICAL SUPPORT (OUTPATIENT)
Dept: FAMILY MEDICINE CLINIC | Facility: CLINIC | Age: 64
End: 2019-01-17

## 2019-01-17 DIAGNOSIS — Z51.6 ALLERGY DESENSITIZATION THERAPY: ICD-10-CM

## 2019-01-17 LAB
ALBUMIN SERPL-MCNC: 4.35 G/DL (ref 3.2–4.8)
ALBUMIN/GLOB SERPL: 2 G/DL (ref 1.5–2.5)
ALP SERPL-CCNC: 68 U/L (ref 25–100)
ALT SERPL-CCNC: 55 U/L (ref 7–40)
AST SERPL-CCNC: 35 U/L (ref 0–33)
BILIRUB SERPL-MCNC: 0.7 MG/DL (ref 0.3–1.2)
BUN SERPL-MCNC: 22 MG/DL (ref 9–23)
BUN/CREAT SERPL: 21.6 (ref 7–25)
CALCIUM SERPL-MCNC: 9.2 MG/DL (ref 8.7–10.4)
CHLORIDE SERPL-SCNC: 104 MMOL/L (ref 99–109)
CHOLEST SERPL-MCNC: 170 MG/DL (ref 0–200)
CO2 SERPL-SCNC: 32 MMOL/L (ref 20–31)
CREAT SERPL-MCNC: 1.02 MG/DL (ref 0.6–1.3)
GLOBULIN SER CALC-MCNC: 2.2 GM/DL
GLUCOSE SERPL-MCNC: 116 MG/DL (ref 70–100)
HBA1C MFR BLD: 6.3 % (ref 4.8–5.6)
HDLC SERPL-MCNC: 41 MG/DL (ref 40–60)
LDLC SERPL CALC-MCNC: 103 MG/DL (ref 0–100)
LDLC/HDLC SERPL: 2.52 {RATIO}
POTASSIUM SERPL-SCNC: 4.7 MMOL/L (ref 3.5–5.5)
PROT SERPL-MCNC: 6.5 G/DL (ref 5.7–8.2)
SODIUM SERPL-SCNC: 143 MMOL/L (ref 132–146)
TRIGL SERPL-MCNC: 128 MG/DL (ref 0–150)
VLDLC SERPL CALC-MCNC: 25.6 MG/DL

## 2019-01-17 PROCEDURE — 95117 IMMUNOTHERAPY INJECTIONS: CPT | Performed by: FAMILY MEDICINE

## 2019-01-21 ENCOUNTER — OFFICE VISIT (OUTPATIENT)
Dept: FAMILY MEDICINE CLINIC | Facility: CLINIC | Age: 64
End: 2019-01-21

## 2019-01-21 VITALS
BODY MASS INDEX: 31.2 KG/M2 | HEIGHT: 77 IN | RESPIRATION RATE: 16 BRPM | SYSTOLIC BLOOD PRESSURE: 130 MMHG | HEART RATE: 72 BPM | WEIGHT: 264.2 LBS | TEMPERATURE: 97.6 F | DIASTOLIC BLOOD PRESSURE: 90 MMHG

## 2019-01-21 DIAGNOSIS — J84.10 PULMONARY FIBROSIS (HCC): ICD-10-CM

## 2019-01-21 DIAGNOSIS — I10 ESSENTIAL HYPERTENSION: ICD-10-CM

## 2019-01-21 DIAGNOSIS — E78.2 MIXED HYPERLIPIDEMIA: ICD-10-CM

## 2019-01-21 DIAGNOSIS — E11.9 TYPE 2 DIABETES MELLITUS WITHOUT COMPLICATION, WITHOUT LONG-TERM CURRENT USE OF INSULIN (HCC): Primary | ICD-10-CM

## 2019-01-21 PROCEDURE — 99214 OFFICE O/P EST MOD 30 MIN: CPT | Performed by: FAMILY MEDICINE

## 2019-01-23 ENCOUNTER — CLINICAL SUPPORT (OUTPATIENT)
Dept: FAMILY MEDICINE CLINIC | Facility: CLINIC | Age: 64
End: 2019-01-23

## 2019-01-23 DIAGNOSIS — Z51.6 DESENSITIZATION TO ALLERGENS: Primary | ICD-10-CM

## 2019-01-23 PROCEDURE — 95117 IMMUNOTHERAPY INJECTIONS: CPT | Performed by: FAMILY MEDICINE

## 2019-01-30 ENCOUNTER — CLINICAL SUPPORT (OUTPATIENT)
Dept: FAMILY MEDICINE CLINIC | Facility: CLINIC | Age: 64
End: 2019-01-30

## 2019-01-30 DIAGNOSIS — Z51.6 ALLERGY DESENSITIZATION THERAPY: Primary | ICD-10-CM

## 2019-01-30 DIAGNOSIS — Z51.6 ALLERGY DESENSITIZATION THERAPY: ICD-10-CM

## 2019-01-30 PROCEDURE — 95117 IMMUNOTHERAPY INJECTIONS: CPT | Performed by: FAMILY MEDICINE

## 2019-02-13 ENCOUNTER — CLINICAL SUPPORT (OUTPATIENT)
Dept: FAMILY MEDICINE CLINIC | Facility: CLINIC | Age: 64
End: 2019-02-13

## 2019-02-13 DIAGNOSIS — Z51.6 ALLERGY DESENSITIZATION THERAPY: Primary | ICD-10-CM

## 2019-02-13 DIAGNOSIS — Z51.6 ALLERGY DESENSITIZATION THERAPY: ICD-10-CM

## 2019-02-13 PROCEDURE — 95117 IMMUNOTHERAPY INJECTIONS: CPT | Performed by: FAMILY MEDICINE

## 2019-02-21 ENCOUNTER — CLINICAL SUPPORT (OUTPATIENT)
Dept: FAMILY MEDICINE CLINIC | Facility: CLINIC | Age: 64
End: 2019-02-21

## 2019-02-21 DIAGNOSIS — Z51.6 DESENSITIZATION TO ALLERGENS: Primary | ICD-10-CM

## 2019-02-21 PROCEDURE — 95117 IMMUNOTHERAPY INJECTIONS: CPT | Performed by: FAMILY MEDICINE

## 2019-02-27 ENCOUNTER — CLINICAL SUPPORT (OUTPATIENT)
Dept: FAMILY MEDICINE CLINIC | Facility: CLINIC | Age: 64
End: 2019-02-27

## 2019-02-27 DIAGNOSIS — Z51.6 ALLERGY DESENSITIZATION THERAPY: ICD-10-CM

## 2019-02-27 DIAGNOSIS — Z51.6 ALLERGY DESENSITIZATION THERAPY: Primary | ICD-10-CM

## 2019-02-27 PROCEDURE — 95117 IMMUNOTHERAPY INJECTIONS: CPT | Performed by: FAMILY MEDICINE

## 2019-03-07 ENCOUNTER — CLINICAL SUPPORT (OUTPATIENT)
Dept: FAMILY MEDICINE CLINIC | Facility: CLINIC | Age: 64
End: 2019-03-07

## 2019-03-07 DIAGNOSIS — Z51.6 ALLERGY DESENSITIZATION THERAPY: Primary | ICD-10-CM

## 2019-03-07 DIAGNOSIS — Z51.6 ALLERGY DESENSITIZATION THERAPY: ICD-10-CM

## 2019-03-07 PROCEDURE — 95117 IMMUNOTHERAPY INJECTIONS: CPT | Performed by: FAMILY MEDICINE

## 2019-03-14 ENCOUNTER — CLINICAL SUPPORT (OUTPATIENT)
Dept: FAMILY MEDICINE CLINIC | Facility: CLINIC | Age: 64
End: 2019-03-14

## 2019-03-14 DIAGNOSIS — Z51.6 ALLERGY DESENSITIZATION THERAPY: ICD-10-CM

## 2019-03-14 DIAGNOSIS — Z51.6 ALLERGY DESENSITIZATION THERAPY: Primary | ICD-10-CM

## 2019-03-14 PROCEDURE — 95117 IMMUNOTHERAPY INJECTIONS: CPT | Performed by: FAMILY MEDICINE

## 2019-03-20 ENCOUNTER — CLINICAL SUPPORT (OUTPATIENT)
Dept: FAMILY MEDICINE CLINIC | Facility: CLINIC | Age: 64
End: 2019-03-20

## 2019-03-20 DIAGNOSIS — Z51.6 ALLERGY DESENSITIZATION THERAPY: ICD-10-CM

## 2019-03-20 DIAGNOSIS — Z51.6 ALLERGY DESENSITIZATION THERAPY: Primary | ICD-10-CM

## 2019-03-20 PROCEDURE — 95117 IMMUNOTHERAPY INJECTIONS: CPT | Performed by: FAMILY MEDICINE

## 2019-03-27 ENCOUNTER — CLINICAL SUPPORT (OUTPATIENT)
Dept: FAMILY MEDICINE CLINIC | Facility: CLINIC | Age: 64
End: 2019-03-27

## 2019-03-27 DIAGNOSIS — Z51.6 ALLERGY DESENSITIZATION THERAPY: ICD-10-CM

## 2019-03-27 PROCEDURE — 95117 IMMUNOTHERAPY INJECTIONS: CPT | Performed by: FAMILY MEDICINE

## 2019-04-03 ENCOUNTER — CLINICAL SUPPORT (OUTPATIENT)
Dept: FAMILY MEDICINE CLINIC | Facility: CLINIC | Age: 64
End: 2019-04-03

## 2019-04-03 DIAGNOSIS — Z51.6 ALLERGY DESENSITIZATION THERAPY: Primary | ICD-10-CM

## 2019-04-03 DIAGNOSIS — Z51.6 ALLERGY DESENSITIZATION THERAPY: ICD-10-CM

## 2019-04-03 PROCEDURE — 95117 IMMUNOTHERAPY INJECTIONS: CPT | Performed by: FAMILY MEDICINE

## 2019-04-17 ENCOUNTER — CLINICAL SUPPORT (OUTPATIENT)
Dept: FAMILY MEDICINE CLINIC | Facility: CLINIC | Age: 64
End: 2019-04-17

## 2019-04-17 DIAGNOSIS — I10 ESSENTIAL HYPERTENSION: ICD-10-CM

## 2019-04-17 DIAGNOSIS — Z51.6 ALLERGY DESENSITIZATION THERAPY: ICD-10-CM

## 2019-04-17 PROCEDURE — 95117 IMMUNOTHERAPY INJECTIONS: CPT | Performed by: FAMILY MEDICINE

## 2019-04-18 RX ORDER — AMLODIPINE BESYLATE 10 MG/1
TABLET ORAL
Qty: 90 TABLET | Refills: 1 | Status: SHIPPED | OUTPATIENT
Start: 2019-04-18 | End: 2019-10-14 | Stop reason: SDUPTHER

## 2019-04-24 ENCOUNTER — CLINICAL SUPPORT (OUTPATIENT)
Dept: FAMILY MEDICINE CLINIC | Facility: CLINIC | Age: 64
End: 2019-04-24

## 2019-04-24 DIAGNOSIS — Z51.6 DESENSITIZATION TO ALLERGENS: Primary | ICD-10-CM

## 2019-04-24 DIAGNOSIS — Z51.6 DESENSITIZATION TO ALLERGENS: ICD-10-CM

## 2019-04-24 PROCEDURE — 95117 IMMUNOTHERAPY INJECTIONS: CPT | Performed by: FAMILY MEDICINE

## 2019-05-02 ENCOUNTER — CLINICAL SUPPORT (OUTPATIENT)
Dept: FAMILY MEDICINE CLINIC | Facility: CLINIC | Age: 64
End: 2019-05-02

## 2019-05-02 DIAGNOSIS — Z51.6 DESENSITIZATION TO ALLERGENS: ICD-10-CM

## 2019-05-02 DIAGNOSIS — Z51.6 ALLERGY DESENSITIZATION THERAPY: ICD-10-CM

## 2019-05-02 PROCEDURE — 95117 IMMUNOTHERAPY INJECTIONS: CPT | Performed by: FAMILY MEDICINE

## 2019-05-09 ENCOUNTER — CLINICAL SUPPORT (OUTPATIENT)
Dept: FAMILY MEDICINE CLINIC | Facility: CLINIC | Age: 64
End: 2019-05-09

## 2019-05-09 DIAGNOSIS — Z51.6 DESENSITIZATION TO ALLERGENS: ICD-10-CM

## 2019-05-09 PROCEDURE — 95117 IMMUNOTHERAPY INJECTIONS: CPT | Performed by: FAMILY MEDICINE

## 2019-05-13 DIAGNOSIS — I10 ESSENTIAL HYPERTENSION: ICD-10-CM

## 2019-05-13 RX ORDER — LOSARTAN POTASSIUM AND HYDROCHLOROTHIAZIDE 12.5; 1 MG/1; MG/1
TABLET ORAL
Qty: 90 TABLET | Refills: 1 | OUTPATIENT
Start: 2019-05-13

## 2019-05-16 ENCOUNTER — CLINICAL SUPPORT (OUTPATIENT)
Dept: FAMILY MEDICINE CLINIC | Facility: CLINIC | Age: 64
End: 2019-05-16

## 2019-05-16 DIAGNOSIS — Z51.6 DESENSITIZATION TO ALLERGENS: ICD-10-CM

## 2019-05-16 PROCEDURE — 95117 IMMUNOTHERAPY INJECTIONS: CPT | Performed by: FAMILY MEDICINE

## 2019-05-20 DIAGNOSIS — I10 ESSENTIAL HYPERTENSION: ICD-10-CM

## 2019-05-20 RX ORDER — LOSARTAN POTASSIUM AND HYDROCHLOROTHIAZIDE 12.5; 1 MG/1; MG/1
TABLET ORAL
Qty: 90 TABLET | Refills: 1 | Status: SHIPPED | OUTPATIENT
Start: 2019-05-20 | End: 2019-10-14 | Stop reason: SDUPTHER

## 2019-05-22 ENCOUNTER — CLINICAL SUPPORT (OUTPATIENT)
Dept: FAMILY MEDICINE CLINIC | Facility: CLINIC | Age: 64
End: 2019-05-22

## 2019-05-22 DIAGNOSIS — Z51.6 ALLERGY DESENSITIZATION THERAPY: Primary | ICD-10-CM

## 2019-05-22 DIAGNOSIS — Z51.6 ALLERGY DESENSITIZATION THERAPY: ICD-10-CM

## 2019-05-22 PROCEDURE — 95117 IMMUNOTHERAPY INJECTIONS: CPT | Performed by: FAMILY MEDICINE

## 2019-05-30 ENCOUNTER — CLINICAL SUPPORT (OUTPATIENT)
Dept: FAMILY MEDICINE CLINIC | Facility: CLINIC | Age: 64
End: 2019-05-30

## 2019-05-30 DIAGNOSIS — Z51.6 DESENSITIZATION TO ALLERGENS: Primary | ICD-10-CM

## 2019-05-30 PROCEDURE — 95117 IMMUNOTHERAPY INJECTIONS: CPT | Performed by: FAMILY MEDICINE

## 2019-06-05 ENCOUNTER — CLINICAL SUPPORT (OUTPATIENT)
Dept: FAMILY MEDICINE CLINIC | Facility: CLINIC | Age: 64
End: 2019-06-05

## 2019-06-05 DIAGNOSIS — Z51.6 ALLERGY DESENSITIZATION THERAPY: Primary | ICD-10-CM

## 2019-06-05 DIAGNOSIS — Z51.6 ALLERGY DESENSITIZATION THERAPY: ICD-10-CM

## 2019-06-05 PROCEDURE — 95117 IMMUNOTHERAPY INJECTIONS: CPT | Performed by: FAMILY MEDICINE

## 2019-06-12 ENCOUNTER — CLINICAL SUPPORT (OUTPATIENT)
Dept: FAMILY MEDICINE CLINIC | Facility: CLINIC | Age: 64
End: 2019-06-12

## 2019-06-12 DIAGNOSIS — Z51.6 ALLERGY DESENSITIZATION THERAPY: Primary | ICD-10-CM

## 2019-06-12 DIAGNOSIS — Z51.6 ALLERGY DESENSITIZATION THERAPY: ICD-10-CM

## 2019-06-12 PROCEDURE — 95117 IMMUNOTHERAPY INJECTIONS: CPT | Performed by: FAMILY MEDICINE

## 2019-06-19 ENCOUNTER — CLINICAL SUPPORT (OUTPATIENT)
Dept: FAMILY MEDICINE CLINIC | Facility: CLINIC | Age: 64
End: 2019-06-19

## 2019-06-19 DIAGNOSIS — Z51.6 ALLERGY DESENSITIZATION THERAPY: ICD-10-CM

## 2019-06-19 DIAGNOSIS — Z51.6 ALLERGY DESENSITIZATION THERAPY: Primary | ICD-10-CM

## 2019-06-19 PROCEDURE — 95117 IMMUNOTHERAPY INJECTIONS: CPT | Performed by: FAMILY MEDICINE

## 2019-06-27 ENCOUNTER — CLINICAL SUPPORT (OUTPATIENT)
Dept: FAMILY MEDICINE CLINIC | Facility: CLINIC | Age: 64
End: 2019-06-27

## 2019-06-27 DIAGNOSIS — Z51.6 ALLERGY DESENSITIZATION THERAPY: ICD-10-CM

## 2019-06-27 PROCEDURE — 95117 IMMUNOTHERAPY INJECTIONS: CPT | Performed by: FAMILY MEDICINE

## 2019-07-11 ENCOUNTER — CLINICAL SUPPORT (OUTPATIENT)
Dept: FAMILY MEDICINE CLINIC | Facility: CLINIC | Age: 64
End: 2019-07-11

## 2019-07-11 DIAGNOSIS — Z51.6 ALLERGY DESENSITIZATION THERAPY: ICD-10-CM

## 2019-07-11 DIAGNOSIS — Z51.6 ALLERGY DESENSITIZATION THERAPY: Primary | ICD-10-CM

## 2019-07-11 PROCEDURE — 95117 IMMUNOTHERAPY INJECTIONS: CPT | Performed by: FAMILY MEDICINE

## 2019-07-25 ENCOUNTER — CLINICAL SUPPORT (OUTPATIENT)
Dept: FAMILY MEDICINE CLINIC | Facility: CLINIC | Age: 64
End: 2019-07-25

## 2019-07-25 DIAGNOSIS — Z51.6 ALLERGY DESENSITIZATION THERAPY: ICD-10-CM

## 2019-07-25 DIAGNOSIS — Z51.6 ALLERGY DESENSITIZATION THERAPY: Primary | ICD-10-CM

## 2019-07-25 PROCEDURE — 95117 IMMUNOTHERAPY INJECTIONS: CPT | Performed by: FAMILY MEDICINE

## 2019-08-07 ENCOUNTER — CLINICAL SUPPORT (OUTPATIENT)
Dept: FAMILY MEDICINE CLINIC | Facility: CLINIC | Age: 64
End: 2019-08-07

## 2019-08-07 DIAGNOSIS — Z51.6 ALLERGY DESENSITIZATION THERAPY: Primary | ICD-10-CM

## 2019-08-07 DIAGNOSIS — Z51.6 ALLERGY DESENSITIZATION THERAPY: ICD-10-CM

## 2019-08-07 PROCEDURE — 95117 IMMUNOTHERAPY INJECTIONS: CPT | Performed by: FAMILY MEDICINE

## 2019-08-14 ENCOUNTER — CLINICAL SUPPORT (OUTPATIENT)
Dept: FAMILY MEDICINE CLINIC | Facility: CLINIC | Age: 64
End: 2019-08-14

## 2019-08-14 DIAGNOSIS — Z51.6 ALLERGY DESENSITIZATION THERAPY: Primary | ICD-10-CM

## 2019-08-14 DIAGNOSIS — Z51.6 ALLERGY DESENSITIZATION THERAPY: ICD-10-CM

## 2019-08-14 PROCEDURE — 95117 IMMUNOTHERAPY INJECTIONS: CPT | Performed by: FAMILY MEDICINE

## 2019-08-21 ENCOUNTER — CLINICAL SUPPORT (OUTPATIENT)
Dept: FAMILY MEDICINE CLINIC | Facility: CLINIC | Age: 64
End: 2019-08-21

## 2019-08-21 DIAGNOSIS — Z51.6 ALLERGY DESENSITIZATION THERAPY: ICD-10-CM

## 2019-08-21 DIAGNOSIS — Z51.6 ALLERGY DESENSITIZATION THERAPY: Primary | ICD-10-CM

## 2019-08-21 PROCEDURE — 95117 IMMUNOTHERAPY INJECTIONS: CPT | Performed by: FAMILY MEDICINE

## 2019-08-27 ENCOUNTER — CLINICAL SUPPORT (OUTPATIENT)
Dept: FAMILY MEDICINE CLINIC | Facility: CLINIC | Age: 64
End: 2019-08-27

## 2019-08-27 DIAGNOSIS — Z51.6 ALLERGY DESENSITIZATION THERAPY: ICD-10-CM

## 2019-08-27 PROCEDURE — 95117 IMMUNOTHERAPY INJECTIONS: CPT | Performed by: FAMILY MEDICINE

## 2019-09-04 ENCOUNTER — CLINICAL SUPPORT (OUTPATIENT)
Dept: FAMILY MEDICINE CLINIC | Facility: CLINIC | Age: 64
End: 2019-09-04

## 2019-09-04 DIAGNOSIS — Z51.6 DESENSITIZATION TO ALLERGENS: Primary | ICD-10-CM

## 2019-09-04 PROCEDURE — 95117 IMMUNOTHERAPY INJECTIONS: CPT | Performed by: FAMILY MEDICINE

## 2019-09-12 ENCOUNTER — CLINICAL SUPPORT (OUTPATIENT)
Dept: FAMILY MEDICINE CLINIC | Facility: CLINIC | Age: 64
End: 2019-09-12

## 2019-09-12 DIAGNOSIS — Z51.6 DESENSITIZATION TO ALLERGENS: ICD-10-CM

## 2019-09-12 PROCEDURE — 95117 IMMUNOTHERAPY INJECTIONS: CPT | Performed by: FAMILY MEDICINE

## 2019-09-18 ENCOUNTER — CLINICAL SUPPORT (OUTPATIENT)
Dept: FAMILY MEDICINE CLINIC | Facility: CLINIC | Age: 64
End: 2019-09-18

## 2019-09-18 DIAGNOSIS — Z51.6 ALLERGY DESENSITIZATION THERAPY: ICD-10-CM

## 2019-09-18 DIAGNOSIS — Z51.6 ALLERGY DESENSITIZATION THERAPY: Primary | ICD-10-CM

## 2019-09-18 PROCEDURE — 95117 IMMUNOTHERAPY INJECTIONS: CPT | Performed by: FAMILY MEDICINE

## 2019-09-26 ENCOUNTER — OFFICE VISIT (OUTPATIENT)
Dept: PULMONOLOGY | Facility: CLINIC | Age: 64
End: 2019-09-26

## 2019-09-26 VITALS
HEIGHT: 76 IN | WEIGHT: 264 LBS | SYSTOLIC BLOOD PRESSURE: 140 MMHG | BODY MASS INDEX: 32.15 KG/M2 | TEMPERATURE: 97.5 F | HEART RATE: 71 BPM | DIASTOLIC BLOOD PRESSURE: 82 MMHG | OXYGEN SATURATION: 96 %

## 2019-09-26 DIAGNOSIS — J84.10 PULMONARY FIBROSIS (HCC): Primary | ICD-10-CM

## 2019-09-26 PROCEDURE — 99212 OFFICE O/P EST SF 10 MIN: CPT | Performed by: INTERNAL MEDICINE

## 2019-10-02 ENCOUNTER — CLINICAL SUPPORT (OUTPATIENT)
Dept: FAMILY MEDICINE CLINIC | Facility: CLINIC | Age: 64
End: 2019-10-02

## 2019-10-02 ENCOUNTER — TELEPHONE (OUTPATIENT)
Dept: FAMILY MEDICINE CLINIC | Facility: CLINIC | Age: 64
End: 2019-10-02

## 2019-10-02 DIAGNOSIS — Z51.6 ALLERGY DESENSITIZATION THERAPY: ICD-10-CM

## 2019-10-02 DIAGNOSIS — Z51.6 ALLERGY DESENSITIZATION THERAPY: Primary | ICD-10-CM

## 2019-10-02 DIAGNOSIS — E78.2 MIXED HYPERLIPIDEMIA: ICD-10-CM

## 2019-10-02 DIAGNOSIS — E11.9 TYPE 2 DIABETES MELLITUS WITHOUT COMPLICATION, WITHOUT LONG-TERM CURRENT USE OF INSULIN (HCC): ICD-10-CM

## 2019-10-02 DIAGNOSIS — I10 ESSENTIAL HYPERTENSION: Primary | ICD-10-CM

## 2019-10-02 PROCEDURE — 95117 IMMUNOTHERAPY INJECTIONS: CPT | Performed by: FAMILY MEDICINE

## 2019-10-02 NOTE — TELEPHONE ENCOUNTER
Anthony Ventura is coming in on 10/14 and he wanted to know if he may have labs done ahead of time so that he can go over them in the visit with you           Call back   [phone]

## 2019-10-06 RX ORDER — OLOPATADINE HYDROCHLORIDE 665 UG/1
SPRAY NASAL
Refills: 3 | COMMUNITY
Start: 2019-09-17

## 2019-10-06 RX ORDER — MONTELUKAST SODIUM 4 MG/1
1 TABLET, CHEWABLE ORAL 2 TIMES DAILY PRN
Refills: 3 | COMMUNITY
Start: 2019-08-07 | End: 2020-06-29

## 2019-10-07 ENCOUNTER — RESULTS ENCOUNTER (OUTPATIENT)
Dept: FAMILY MEDICINE CLINIC | Facility: CLINIC | Age: 64
End: 2019-10-07

## 2019-10-07 DIAGNOSIS — E78.2 MIXED HYPERLIPIDEMIA: ICD-10-CM

## 2019-10-07 DIAGNOSIS — E11.9 TYPE 2 DIABETES MELLITUS WITHOUT COMPLICATION, WITHOUT LONG-TERM CURRENT USE OF INSULIN (HCC): ICD-10-CM

## 2019-10-07 DIAGNOSIS — N52.8 OTHER MALE ERECTILE DYSFUNCTION: Primary | ICD-10-CM

## 2019-10-07 DIAGNOSIS — I10 ESSENTIAL HYPERTENSION: ICD-10-CM

## 2019-10-08 ENCOUNTER — RESULTS ENCOUNTER (OUTPATIENT)
Dept: FAMILY MEDICINE CLINIC | Facility: CLINIC | Age: 64
End: 2019-10-08

## 2019-10-08 DIAGNOSIS — N52.8 OTHER MALE ERECTILE DYSFUNCTION: ICD-10-CM

## 2019-10-09 ENCOUNTER — CLINICAL SUPPORT (OUTPATIENT)
Dept: FAMILY MEDICINE CLINIC | Facility: CLINIC | Age: 64
End: 2019-10-09

## 2019-10-09 DIAGNOSIS — Z51.6 ALLERGY DESENSITIZATION THERAPY: Primary | ICD-10-CM

## 2019-10-09 DIAGNOSIS — Z51.6 ALLERGY DESENSITIZATION THERAPY: ICD-10-CM

## 2019-10-09 PROCEDURE — 95117 IMMUNOTHERAPY INJECTIONS: CPT | Performed by: FAMILY MEDICINE

## 2019-10-10 LAB
ALBUMIN SERPL-MCNC: 4.3 G/DL (ref 3.5–5.2)
ALBUMIN/GLOB SERPL: 2 G/DL
ALP SERPL-CCNC: 68 U/L (ref 39–117)
ALT SERPL-CCNC: 49 U/L (ref 1–41)
AST SERPL-CCNC: 32 U/L (ref 1–40)
BILIRUB SERPL-MCNC: 0.4 MG/DL (ref 0.2–1.2)
BUN SERPL-MCNC: 15 MG/DL (ref 8–23)
BUN/CREAT SERPL: 15.8 (ref 7–25)
CALCIUM SERPL-MCNC: 9.1 MG/DL (ref 8.6–10.5)
CHLORIDE SERPL-SCNC: 99 MMOL/L (ref 98–107)
CHOLEST SERPL-MCNC: 171 MG/DL (ref 0–200)
CO2 SERPL-SCNC: 27.5 MMOL/L (ref 22–29)
CREAT SERPL-MCNC: 0.95 MG/DL (ref 0.76–1.27)
GLOBULIN SER CALC-MCNC: 2.2 GM/DL
GLUCOSE SERPL-MCNC: 100 MG/DL (ref 65–99)
HBA1C MFR BLD: 6.2 % (ref 4.8–5.6)
HDLC SERPL-MCNC: 44 MG/DL (ref 40–60)
LDLC SERPL CALC-MCNC: 102 MG/DL (ref 0–100)
LDLC/HDLC SERPL: 2.32 {RATIO}
MICROALBUMIN UR-MCNC: <3 UG/ML
POTASSIUM SERPL-SCNC: 4.7 MMOL/L (ref 3.5–5.2)
PROT SERPL-MCNC: 6.5 G/DL (ref 6–8.5)
SODIUM SERPL-SCNC: 143 MMOL/L (ref 136–145)
TESTOST SERPL-MCNC: 195 NG/DL (ref 264–916)
TRIGL SERPL-MCNC: 125 MG/DL (ref 0–150)
VLDLC SERPL CALC-MCNC: 25 MG/DL

## 2019-10-13 RX ORDER — SIMVASTATIN 40 MG
40 TABLET ORAL NIGHTLY
Qty: 90 TABLET | Refills: 3 | Status: CANCELLED | OUTPATIENT
Start: 2019-10-13

## 2019-10-13 NOTE — PROGRESS NOTES
Subjective   Ramirez Fernandez is a 64 y.o. male.   Chief Complaint   Patient presents with   • Follow-up   • Diabetes   • Hypertension   • Hyperlipidemia   • Pulm Fibrosis (see's Emma)     History of Present Illness     Patient returns today for follow-up of chronic medical conditions as noted above.      Since Ramirez's last visit He has not been to an Emergency Dept or Urgent Care facility.    He has had no problems with current medications.    I last saw him in January of this year.  He had lab work done prior to this office visit last week.    Saw Dr. Carter his pulmonary specialist about 2 weeks ago.  He recommended continuing his current medications.  Flu vaccine was administered and he recommended following up in February 2020 and at that time get a chest x-ray and PFTs.  He felt he was relatively stable and made no changes.    His labs that were done last week revealed a low testosterone level of 195.  Has had trouble with erectile dysfunction for a while now.    Hgb A1c is stable to slightly improved down from 6.3% to 6.2% at last week's check.  LDL stable at 102 mg/dL with a goal of less than 70.  One slight transaminase increase.  Overall his transaminases have been trending in the normal direction over the last year.    In general, over the last several weeks he is been feeling stable - not much change; not been great in his diet and minimal exercise last few months due to heat / humidity    Still with diarrhea.    Lab Results   Component Value Date    CHLPL 171 10/09/2019    CHLPL 170 01/17/2019    CHLPL 167 09/17/2018     Lab Results   Component Value Date    TRIG 125 10/09/2019    TRIG 128 01/17/2019    TRIG 136 09/17/2018     Lab Results   Component Value Date    HDL 44 10/09/2019    HDL 41 01/17/2019    HDL 45 09/17/2018     Lab Results   Component Value Date     (H) 10/09/2019     (H) 01/17/2019    LDL 95 09/17/2018       The following portions of the patient's history were reviewed and  updated as appropriate: allergies, current medications, past medical history, past social history and problem list.    Review of Systems   Constitutional: Negative for fever and unexpected weight loss.   HENT: Negative.    Eyes: Negative for blurred vision.   Respiratory: Positive for shortness of breath (chronic).    Cardiovascular: Negative.  Negative for chest pain and leg swelling.   Gastrointestinal: Negative for constipation and nausea.   Endocrine: Negative for polydipsia and polyuria.   Skin: Negative.    Neurological: Negative.  Negative for dizziness.   Psychiatric/Behavioral: Negative.        Objective   Physical Exam   Constitutional: He appears well-developed and well-nourished. No distress.   Eyes: Conjunctivae are normal. No scleral icterus.   Neck: Carotid bruit is not present.   Cardiovascular: Normal rate, regular rhythm and normal heart sounds.   No murmur heard.  Pulmonary/Chest: Effort normal and breath sounds normal. He has no wheezes.   Coarse breath sounds with slight crackles at the right base-chronic   Musculoskeletal: He exhibits no edema.    Ramirez had a diabetic foot exam performed today.  Skin Integrity  -  His right foot skin is intact.His left foot skin is intact..  Neurological: He is alert. No sensory deficit.   Skin: Skin is warm and dry.   Psychiatric: He has a normal mood and affect. Thought content normal.   Nursing note and vitals reviewed.        Assessment/Plan   Ramirez was seen today for follow-up, diabetes, hypertension, hyperlipidemia and pulm fibrosis (see's braulio).    Diagnoses and all orders for this visit:    Type 2 diabetes mellitus without complication, without long-term current use of insulin (CMS/[facility])  Comments:  Urged as much of a whole food, plant-based diet as he can.  Appreciate stability of his labs.  Orders:  -     Hemoglobin A1c; Future    Essential hypertension  Comments:  Stable-no changes  Orders:  -     Comprehensive Metabolic Panel; Future  -      amLODIPine (NORVASC) 10 MG tablet; Take 1 tablet by mouth Daily.  -     losartan-hydrochlorothiazide (HYZAAR) 100-12.5 MG per tablet; Take 1 tablet by mouth Daily.    Mixed hyperlipidemia  Comments:  Increase his simvastatin to 20 mg daily from qod (current)  Orders:  -     Lipid Panel With LDL / HDL Ratio; Future    Pulmonary fibrosis (CMS/HCC)    Other male erectile dysfunction  -     PSA DIAGNOSTIC; Future  -     tadalafil (CIALIS) 20 MG tablet; Take 1 tablet by mouth Daily As Needed for erectile dysfunction.    Hypogonadism male  Comments:  Likely aggravating his erectile dysfunction.  Recommend checking a PSA and starting him on testosterone supplementation  Orders:  -     PSA DIAGNOSTIC; Future  -     Testosterone; Future  -     testosterone (ANDROGEL) 50 MG/5GM (1%) gel gel; Place 50 mg on the skin as directed by provider Daily.    Essential hypertension  -     Comprehensive Metabolic Panel; Future  -     amLODIPine (NORVASC) 10 MG tablet; Take 1 tablet by mouth Daily.  -     losartan-hydrochlorothiazide (HYZAAR) 100-12.5 MG per tablet; Take 1 tablet by mouth Daily.    Other orders  -     Cancel: simvastatin (ZOCOR) 40 MG tablet; Take 1 tablet by mouth Every Night.    Recheck with me in 6 months time.  Hemoglobin A1c, CMP, testosterone level, lipid panel prior to his office visit.    Info on Low-FODMAP         Daniel Cruz MD  10/14/2019

## 2019-10-14 ENCOUNTER — TELEPHONE (OUTPATIENT)
Dept: FAMILY MEDICINE CLINIC | Facility: CLINIC | Age: 64
End: 2019-10-14

## 2019-10-14 ENCOUNTER — OFFICE VISIT (OUTPATIENT)
Dept: FAMILY MEDICINE CLINIC | Facility: CLINIC | Age: 64
End: 2019-10-14

## 2019-10-14 VITALS
HEART RATE: 76 BPM | BODY MASS INDEX: 32.2 KG/M2 | SYSTOLIC BLOOD PRESSURE: 130 MMHG | HEIGHT: 76 IN | WEIGHT: 264.4 LBS | DIASTOLIC BLOOD PRESSURE: 70 MMHG | TEMPERATURE: 98.4 F | RESPIRATION RATE: 16 BRPM

## 2019-10-14 DIAGNOSIS — N52.8 OTHER MALE ERECTILE DYSFUNCTION: ICD-10-CM

## 2019-10-14 DIAGNOSIS — E11.9 TYPE 2 DIABETES MELLITUS WITHOUT COMPLICATION, WITHOUT LONG-TERM CURRENT USE OF INSULIN (HCC): Primary | ICD-10-CM

## 2019-10-14 DIAGNOSIS — I10 ESSENTIAL HYPERTENSION: ICD-10-CM

## 2019-10-14 DIAGNOSIS — E29.1 HYPOGONADISM MALE: ICD-10-CM

## 2019-10-14 DIAGNOSIS — E78.2 MIXED HYPERLIPIDEMIA: ICD-10-CM

## 2019-10-14 DIAGNOSIS — J84.10 PULMONARY FIBROSIS (HCC): ICD-10-CM

## 2019-10-14 PROCEDURE — 99214 OFFICE O/P EST MOD 30 MIN: CPT | Performed by: FAMILY MEDICINE

## 2019-10-14 RX ORDER — TESTOSTERONE GEL, 1% 10 MG/G
50 GEL TRANSDERMAL DAILY
Qty: 5 G | Refills: 5 | Status: SHIPPED | OUTPATIENT
Start: 2019-10-14 | End: 2020-08-10 | Stop reason: SDUPTHER

## 2019-10-14 RX ORDER — TADALAFIL 20 MG/1
20 TABLET ORAL DAILY PRN
Qty: 8 TABLET | Refills: 5 | Status: SHIPPED | OUTPATIENT
Start: 2019-10-14

## 2019-10-14 RX ORDER — LOSARTAN POTASSIUM AND HYDROCHLOROTHIAZIDE 12.5; 1 MG/1; MG/1
1 TABLET ORAL DAILY
Qty: 90 TABLET | Refills: 3 | Status: SHIPPED | OUTPATIENT
Start: 2019-10-14 | End: 2021-05-06

## 2019-10-14 RX ORDER — AMLODIPINE BESYLATE 10 MG/1
10 TABLET ORAL DAILY
Qty: 90 TABLET | Refills: 3 | Status: SHIPPED | OUTPATIENT
Start: 2019-10-14 | End: 2022-06-02

## 2019-10-14 NOTE — PATIENT INSTRUCTIONS
Low-FODMAP Eating Plan    FODMAPs (fermentable oligosaccharides, disaccharides, monosaccharides, and polyols) are sugars that are hard for some people to digest. A low-FODMAP eating plan may help some people who have bowel (intestinal) diseases to manage their symptoms.  This meal plan can be complicated to follow. Work with a diet and nutrition specialist (dietitian) to make a low-FODMAP eating plan that is right for you. A dietitian can make sure that you get enough nutrition from this diet.  What are tips for following this plan?  Reading food labels  · Check labels for hidden FODMAPs such as:  ? High-fructose syrup.  ? Honey.  ? Agave.  ? Natural fruit flavors.  ? Onion or garlic powder.  · Choose low-FODMAP foods that contain 3-4 grams of fiber per serving.  · Check food labels for serving sizes. Eat only one serving at a time to make sure FODMAP levels stay low.  Meal planning  · Follow a low-FODMAP eating plan for up to 6 weeks, or as told by your health care provider or dietitian.  · To follow the eating plan:  1. Eliminate high-FODMAP foods from your diet completely.  2. Gradually reintroduce high-FODMAP foods into your diet one at a time. Most people should wait a few days after introducing one high-FODMAP food before they introduce the next high-FODMAP food. Your dietitian can recommend how quickly you may reintroduce foods.  3. Keep a daily record of what you eat and drink, and make note of any symptoms that you have after eating.  4. Review your daily record with a dietitian regularly. Your dietitian can help you identify which foods you can eat and which foods you should avoid.  General tips  · Drink enough fluid each day to keep your urine pale yellow.  · Avoid processed foods. These often have added sugar and may be high in FODMAPs.  · Avoid most dairy products, whole grains, and sweeteners.  · Work with a dietitian to make sure you get enough fiber in your diet.  Recommended  "foods  Grains  · Gluten-free grains, such as rice, oats, buckwheat, quinoa, corn, polenta, and millet. Gluten-free pasta, bread, or cereal. Rice noodles. Corn tortillas.  Vegetables  · Eggplant, zucchini, cucumber, peppers, green beans, [place] sprouts, bean sprouts, lettuce, arugula, kale, Swiss chard, spinach, josh greens, bok elo, summer squash, potato, and tomato. Limited amounts of corn, carrot, and sweet potato. Green parts of scallions.  Fruits  · Bananas, oranges, román, limes, blueberries, raspberries, strawberries, grapes, cantaloupe, honeydew melon, kiwi, papaya, passion fruit, and pineapple. Limited amounts of dried cranberries, banana chips, and shredded coconut.  Dairy  · Lactose-free milk, yogurt, and kefir. Lactose-free cottage cheese and ice cream. Non-dairy milks, such as almond, coconut, hemp, and rice milk. Yogurts made of non-dairy milks. Limited amounts of goat cheese, brie, mozzarella, parmesan, swiss, and other hard cheeses.  Meats and other protein foods  · Unseasoned beef, pork, poultry, or fish. Eggs. Clarke. Tofu (firm) and tempeh. Limited amounts of nuts and seeds, such as almonds, walnuts, brazil nuts, pecans, peanuts, pumpkin seeds, wyatt seeds, and sunflower seeds.  Fats and oils  · Butter-free spreads. Vegetable oils, such as olive, canola, and sunflower oil.  Seasoning and other foods  · Artificial sweeteners with names that do not end in \"ol\" such as aspartame, saccharine, and stevia. Maple syrup, white table sugar, raw sugar, brown sugar, and molasses. Fresh basil, coriander, parsley, rosemary, and thyme.  Beverages  · Water and mineral water. Sugar-sweetened soft drinks. Small amounts of orange juice or cranberry juice. Black and green tea. Most dry umer. Coffee.  This may not be a complete list of low-FODMAP foods. Talk with your dietitian for more information.  Foods to avoid  Grains  · Wheat, including kamut, durum, and semolina. Barley and bulgur. Couscous. Wheat-based " cereals. Wheat noodles, bread, crackers, and pastries.  Vegetables  · Chicory root, artichoke, asparagus, cabbage, snow peas, sugar snap peas, mushrooms, and cauliflower. Onions, garlic, leeks, and the white part of scallions.  Fruits  · Fresh, dried, and juiced forms of apple, pear, watermelon, peach, plum, cherries, apricots, blackberries, boysenberries, figs, nectarines, and bettina. Avocado.  Dairy  · Milk, yogurt, ice cream, and soft cheese. Cream and sour cream. Milk-based sauces. Custard.  Meats and other protein foods  · Fried or fatty meat. Sausage. Cashews and pistachios. Soybeans, baked beans, black beans, chickpeas, kidney beans, stef beans, navy beans, lentils, and split peas.  Seasoning and other foods  · Any sugar-free gum or candy. Foods that contain artificial sweeteners such as sorbitol, mannitol, isomalt, or xylitol. Foods that contain honey, high-fructose corn syrup, or agave. Bouillon, vegetable stock, beef stock, and chicken stock. Garlic and onion powder. Condiments made with onion, such as hummus, chutney, pickles, relish, salad dressing, and salsa. Tomato paste.  Beverages  · Chicory-based drinks. Coffee substitutes. Chamomile tea. Fennel tea. Sweet or fortified umer such as port or jackson. Diet soft drinks made with isomalt, mannitol, maltitol, sorbitol, or xylitol. Apple, pear, and bettina juice. Juices with high-fructose corn syrup.  This may not be a complete list of high-FODMAP foods. Talk with your dietitian to discuss what dietary choices are best for you.   Summary  · A low-FODMAP eating plan is a short-term diet that eliminates FODMAPs from your diet to help ease symptoms of certain bowel diseases.  · The eating plan usually lasts up to 6 weeks. After that, high-FODMAP foods are restarted gradually, one at a time, so you can find out which may be causing symptoms.  · A low-FODMAP eating plan can be complicated. It is best to work with a dietitian who has experience with this type of  plan.  This information is not intended to replace advice given to you by your health care provider. Make sure you discuss any questions you have with your health care provider.  Document Released: 08/14/2018 Document Revised: 08/14/2018 Document Reviewed: 08/14/2018  [employer] Interactive Patient Education © 2019 [employer]

## 2019-10-14 NOTE — TELEPHONE ENCOUNTER
PEDRO FROM [facility]      PLEASE CALL REGARDING TESTOSTERONE RX SENT IN TODAY    [phone]

## 2019-10-14 NOTE — TELEPHONE ENCOUNTER
What you sent over could be packets or a tube.  They are assuming you want packets. However you are only dispensing one packet to him.  Do you want a 30 day supply or a 90 day supply? Please resend.

## 2019-10-18 ENCOUNTER — TELEPHONE (OUTPATIENT)
Dept: FAMILY MEDICINE CLINIC | Facility: CLINIC | Age: 64
End: 2019-10-18

## 2019-10-18 DIAGNOSIS — E29.1 HYPOGONADISM MALE: Primary | ICD-10-CM

## 2019-10-18 NOTE — TELEPHONE ENCOUNTER
NELYI:   You sent in testosterone for pt. I attempted the prior auth on it. However, it stops me at the question. (Has pt had two pretreatment low lab readings?) I only see one.  He will have to return in a week or two for another testosterone level. Will you please place lab order?    Then we can let pt know the status of this.

## 2019-10-19 NOTE — TELEPHONE ENCOUNTER
Another T level ordered - let patient know that insurance requires a second low value to cover replacement. Come by anytime we're open.

## 2019-10-20 ENCOUNTER — RESULTS ENCOUNTER (OUTPATIENT)
Dept: FAMILY MEDICINE CLINIC | Facility: CLINIC | Age: 64
End: 2019-10-20

## 2019-10-20 DIAGNOSIS — E29.1 HYPOGONADISM MALE: ICD-10-CM

## 2019-10-23 ENCOUNTER — CLINICAL SUPPORT (OUTPATIENT)
Dept: FAMILY MEDICINE CLINIC | Facility: CLINIC | Age: 64
End: 2019-10-23

## 2019-10-23 DIAGNOSIS — Z51.6 ALLERGY DESENSITIZATION THERAPY: ICD-10-CM

## 2019-10-23 DIAGNOSIS — Z51.6 ALLERGY DESENSITIZATION THERAPY: Primary | ICD-10-CM

## 2019-10-23 PROCEDURE — 95117 IMMUNOTHERAPY INJECTIONS: CPT | Performed by: FAMILY MEDICINE

## 2019-10-24 LAB — TESTOST SERPL-MCNC: 233 NG/DL (ref 264–916)

## 2019-11-05 ENCOUNTER — CLINICAL SUPPORT (OUTPATIENT)
Dept: FAMILY MEDICINE CLINIC | Facility: CLINIC | Age: 64
End: 2019-11-05

## 2019-11-05 DIAGNOSIS — Z51.6 ALLERGY DESENSITIZATION THERAPY: Primary | ICD-10-CM

## 2019-11-05 PROCEDURE — 95117 IMMUNOTHERAPY INJECTIONS: CPT | Performed by: FAMILY MEDICINE

## 2019-11-13 ENCOUNTER — TELEPHONE (OUTPATIENT)
Dept: FAMILY MEDICINE CLINIC | Facility: CLINIC | Age: 64
End: 2019-11-13

## 2019-11-13 DIAGNOSIS — R00.0 TACHYCARDIA: Primary | ICD-10-CM

## 2019-11-13 NOTE — TELEPHONE ENCOUNTER
PAO;PT CALLED    PT WAS SEEN IN [facility] ER YESTERDAY FOR RAPID HEART BEAT AND INCREASED BLOOD PRESSURE-HIS   HEART CHECKED OUT OK BUT SUGGEST HE FU WITH A CARDIOLOGY  DOCTOR-HE IS REQUESTING A REFERRAL TO DR HUA   WITH [employer]    [phone]

## 2019-11-17 NOTE — PROGRESS NOTES
"Subjective   Ramirez Fernandez is a 64 y.o. male.   Chief Complaint   Patient presents with   • FU from [facility] ER / 11-12-19   • headache, neck pain & palpitations   • Allergy Injection   • Order for 2nd Hep A vaccine     History of Present Illness     Ramirez was seen at [facility] ED on 11/12/19 for rapid HR and increased BP. He did not have evidence for MI, but they wanted him to see cardiology. We referred him and he is scheduled to see Dr. Riley on 12/3/19.     During the initial flare - palps lasted 4-5 mins and forced him to lay down. See ED report for additional details of history (I reviewed).     His D/C Dx from [facility] was Palpitations and HTN; CXR showed possible worsening of his chronic \"infiltrates\" - normal heart size and no effusion noted. ECG was normal.   He has been having some head and neck pain described as pounding. Heart will feel like pounding and he feels a pulsation in both sides of his neck.  Some lightheadedness at times. While in the ED, SBP ranged from 144 to 184 and DBP was 76 to 91. He was given 25 mg of hydralazine in the ED. And they gave him a Rx for hydralazine 10 mg qid #30 prn SBP>160; has taken two.    Since his time in the ED - feeling a little better.    Behind left ear - cyst    Had been having palpitations a couple of times a month that would last a few seconds and not stop him from doing anything.    The following portions of the patient's history were reviewed and updated as appropriate: allergies, current medications, past medical history, past social history and problem list.    Review of Systems   Constitutional: Negative.  Negative for fever.   HENT: Negative.    Respiratory: Positive for shortness of breath (chronic).    Cardiovascular: Positive for palpitations. Negative for leg swelling.   Gastrointestinal: Negative.    Skin:        Place behind left ear - not painful   Neurological: Positive for light-headedness and headache. Negative for dizziness, tremors and weakness. "   Psychiatric/Behavioral: Negative.        Objective   Physical Exam   Constitutional: He is oriented to person, place, and time. He appears well-developed and well-nourished. No distress.   HENT:   Small bb sized benign cyst behind left ear   Neck: Neck supple. No JVD present. Carotid bruit is not present.   Cardiovascular: Normal rate and regular rhythm.   Pulmonary/Chest: Effort normal and breath sounds normal.   Musculoskeletal: He exhibits no edema.   Lymphadenopathy:     He has no cervical adenopathy.   Neurological: He is alert and oriented to person, place, and time.   Skin: Skin is warm and dry.   Psychiatric: He has a normal mood and affect. His behavior is normal.   Nursing note and vitals reviewed.        Assessment/Plan   Ramirez was seen today for fu from [facility] er / 11-12-19, headache, neck pain & palpitations, allergy injection and order for 2nd hep a vaccine.    Diagnoses and all orders for this visit:    Palpitations  Comments:  Unclear etiology at this point  Orders:  -     Echocardiogram Stress Test For Right Ventricular Systolic Pressure; Future  -     Holter monitor - 48 hour; Future  -     CBC & Differential  -     TSH  -     Comprehensive Metabolic Panel    Essential hypertension  Comments:  May need to change meds - BB would make most sense if okay with pulmonary - await stress test to see what BP does  Orders:  -     Echocardiogram Stress Test For Right Ventricular Systolic Pressure; Future  -     Comprehensive Metabolic Panel    Pulmonary fibrosis (CMS/HCC)    Encounter for immunization  Comments:  Hep A given    Allergic rhinitis due to pollen, unspecified seasonality  Comments:  Immunotherapy today    We'll go ahead and get the evaluation started and have him keep the appt with Dr. Riley.     Cyst behind ear benign - monitor       Daniel Cruz MD  11/21/2019

## 2019-11-21 ENCOUNTER — OFFICE VISIT (OUTPATIENT)
Dept: FAMILY MEDICINE CLINIC | Facility: CLINIC | Age: 64
End: 2019-11-21

## 2019-11-21 VITALS
BODY MASS INDEX: 32.63 KG/M2 | TEMPERATURE: 98 F | HEIGHT: 76 IN | RESPIRATION RATE: 16 BRPM | DIASTOLIC BLOOD PRESSURE: 80 MMHG | WEIGHT: 268 LBS | SYSTOLIC BLOOD PRESSURE: 150 MMHG | HEART RATE: 84 BPM

## 2019-11-21 DIAGNOSIS — J84.10 PULMONARY FIBROSIS (HCC): ICD-10-CM

## 2019-11-21 DIAGNOSIS — Z51.6 ALLERGY DESENSITIZATION THERAPY: Primary | ICD-10-CM

## 2019-11-21 DIAGNOSIS — R00.2 PALPITATIONS: Primary | ICD-10-CM

## 2019-11-21 DIAGNOSIS — J30.1 ALLERGIC RHINITIS DUE TO POLLEN, UNSPECIFIED SEASONALITY: ICD-10-CM

## 2019-11-21 DIAGNOSIS — Z23 ENCOUNTER FOR IMMUNIZATION: ICD-10-CM

## 2019-11-21 DIAGNOSIS — I10 ESSENTIAL HYPERTENSION: ICD-10-CM

## 2019-11-21 DIAGNOSIS — Z23 NEED FOR HEPATITIS A IMMUNIZATION: ICD-10-CM

## 2019-11-21 PROCEDURE — 99214 OFFICE O/P EST MOD 30 MIN: CPT | Performed by: FAMILY MEDICINE

## 2019-11-21 PROCEDURE — 90471 IMMUNIZATION ADMIN: CPT | Performed by: FAMILY MEDICINE

## 2019-11-21 PROCEDURE — 95117 IMMUNOTHERAPY INJECTIONS: CPT | Performed by: FAMILY MEDICINE

## 2019-11-21 PROCEDURE — 90632 HEPA VACCINE ADULT IM: CPT | Performed by: FAMILY MEDICINE

## 2019-11-21 RX ORDER — HYDRALAZINE HYDROCHLORIDE 10 MG/1
TABLET, FILM COATED ORAL
Refills: 0 | COMMUNITY
Start: 2019-11-12 | End: 2021-05-06

## 2019-11-22 LAB
ALBUMIN SERPL-MCNC: 4.6 G/DL (ref 3.5–5.2)
ALBUMIN/GLOB SERPL: 1.8 G/DL
ALP SERPL-CCNC: 67 U/L (ref 39–117)
ALT SERPL-CCNC: 52 U/L (ref 1–41)
AST SERPL-CCNC: 38 U/L (ref 1–40)
BASOPHILS # BLD AUTO: 0.1 10*3/MM3 (ref 0–0.2)
BASOPHILS NFR BLD AUTO: 1.2 % (ref 0–1.5)
BILIRUB SERPL-MCNC: 0.4 MG/DL (ref 0.2–1.2)
BUN SERPL-MCNC: 16 MG/DL (ref 8–23)
BUN/CREAT SERPL: 18.4 (ref 7–25)
CALCIUM SERPL-MCNC: 9.2 MG/DL (ref 8.6–10.5)
CHLORIDE SERPL-SCNC: 101 MMOL/L (ref 98–107)
CO2 SERPL-SCNC: 32.4 MMOL/L (ref 22–29)
CREAT SERPL-MCNC: 0.87 MG/DL (ref 0.76–1.27)
EOSINOPHIL # BLD AUTO: 0.28 10*3/MM3 (ref 0–0.4)
EOSINOPHIL NFR BLD AUTO: 3.3 % (ref 0.3–6.2)
ERYTHROCYTE [DISTWIDTH] IN BLOOD BY AUTOMATED COUNT: 13.2 % (ref 12.3–15.4)
GLOBULIN SER CALC-MCNC: 2.6 GM/DL
GLUCOSE SERPL-MCNC: 132 MG/DL (ref 65–99)
HCT VFR BLD AUTO: 46.6 % (ref 37.5–51)
HGB BLD-MCNC: 15.3 G/DL (ref 13–17.7)
IMM GRANULOCYTES # BLD AUTO: 0.03 10*3/MM3 (ref 0–0.05)
IMM GRANULOCYTES NFR BLD AUTO: 0.4 % (ref 0–0.5)
LYMPHOCYTES # BLD AUTO: 1.26 10*3/MM3 (ref 0.7–3.1)
LYMPHOCYTES NFR BLD AUTO: 14.9 % (ref 19.6–45.3)
MCH RBC QN AUTO: 29.4 PG (ref 26.6–33)
MCHC RBC AUTO-ENTMCNC: 32.8 G/DL (ref 31.5–35.7)
MCV RBC AUTO: 89.6 FL (ref 79–97)
MONOCYTES # BLD AUTO: 0.98 10*3/MM3 (ref 0.1–0.9)
MONOCYTES NFR BLD AUTO: 11.6 % (ref 5–12)
NEUTROPHILS # BLD AUTO: 5.82 10*3/MM3 (ref 1.7–7)
NEUTROPHILS NFR BLD AUTO: 68.6 % (ref 42.7–76)
NRBC BLD AUTO-RTO: 0 /100 WBC (ref 0–0.2)
PLATELET # BLD AUTO: 195 10*3/MM3 (ref 140–450)
POTASSIUM SERPL-SCNC: 5.4 MMOL/L (ref 3.5–5.2)
PROT SERPL-MCNC: 7.2 G/DL (ref 6–8.5)
RBC # BLD AUTO: 5.2 10*6/MM3 (ref 4.14–5.8)
SODIUM SERPL-SCNC: 141 MMOL/L (ref 136–145)
TSH SERPL DL<=0.005 MIU/L-ACNC: 2.28 UIU/ML (ref 0.27–4.2)
WBC # BLD AUTO: 8.47 10*3/MM3 (ref 3.4–10.8)

## 2019-12-02 ENCOUNTER — RESULTS ENCOUNTER (OUTPATIENT)
Dept: FAMILY MEDICINE CLINIC | Facility: CLINIC | Age: 64
End: 2019-12-02

## 2019-12-02 DIAGNOSIS — E29.1 HYPOGONADISM MALE: ICD-10-CM

## 2019-12-03 ENCOUNTER — CONSULT (OUTPATIENT)
Dept: CARDIOLOGY | Facility: CLINIC | Age: 64
End: 2019-12-03

## 2019-12-03 VITALS
WEIGHT: 269 LBS | HEIGHT: 77 IN | SYSTOLIC BLOOD PRESSURE: 146 MMHG | BODY MASS INDEX: 31.76 KG/M2 | DIASTOLIC BLOOD PRESSURE: 87 MMHG | HEART RATE: 65 BPM

## 2019-12-03 DIAGNOSIS — E78.2 MIXED HYPERLIPIDEMIA: ICD-10-CM

## 2019-12-03 DIAGNOSIS — R00.2 PALPITATIONS: ICD-10-CM

## 2019-12-03 DIAGNOSIS — I10 ESSENTIAL HYPERTENSION: ICD-10-CM

## 2019-12-03 DIAGNOSIS — R07.2 PRECORDIAL CHEST PAIN: Primary | ICD-10-CM

## 2019-12-03 PROCEDURE — 93010 ELECTROCARDIOGRAM REPORT: CPT | Performed by: INTERNAL MEDICINE

## 2019-12-03 PROCEDURE — 99214 OFFICE O/P EST MOD 30 MIN: CPT | Performed by: INTERNAL MEDICINE

## 2019-12-03 RX ORDER — UBIDECARENONE 75 MG
200 CAPSULE ORAL 3 TIMES DAILY
COMMUNITY

## 2019-12-03 RX ORDER — SIMVASTATIN 20 MG
20 TABLET ORAL EVERY OTHER DAY
COMMUNITY
End: 2020-01-06

## 2019-12-03 RX ORDER — MONTELUKAST SODIUM 10 MG/1
10 TABLET ORAL NIGHTLY
COMMUNITY
End: 2020-06-29

## 2019-12-03 NOTE — PROGRESS NOTES
Subjective:     Encounter Date:12/03/2019    Primary Care Physician: Daniel Cruz MD      Patient ID: Ramirez Fernandez is a 64 y.o. male.    Chief Complaint:Rapid Heart Rate (consult)    PROBLEM LIST:  1. Palpitations  2. Chest pain   3. Hypertension  4. Dyslipidemia  5. Diabetes mellitus, borderline  6. Idiopathic pulmonary fibrosis  7. Raynaud's disease  8. Surgeries:  a. Cholecystectomy  b. Bilateral ear tubes      No Known Allergies      Current Outpatient Medications:   •  Acetylcysteine (NAC PO), Take  by mouth daily., Disp: , Rfl:   •  amLODIPine (NORVASC) 10 MG tablet, Take 1 tablet by mouth Daily., Disp: 90 tablet, Rfl: 3  •  aspirin 81 MG tablet, Take 1 tablet by mouth daily., Disp: , Rfl:   •  Cholecalciferol (VITAMIN D3) 5000 UNITS capsule capsule, Take 5,000 Units by mouth daily., Disp: , Rfl:   •  Coenzyme Q10 (CO Q-10) 200 MG capsule, Take 200 mg by mouth 3 (Three) Times a Day., Disp: , Rfl:   •  Green Tea, Camillia sinensis, (GREEN TEA EXTRACT PO), Take  by mouth., Disp: , Rfl:   •  ibuprofen (ADVIL,MOTRIN) 200 MG tablet, Take 2 tablets by mouth Every 6 (Six) Hours As Needed., Disp: , Rfl:   •  Loratadine 10 MG capsule, Take  by mouth., Disp: , Rfl:   •  losartan-hydrochlorothiazide (HYZAAR) 100-12.5 MG per tablet, Take 1 tablet by mouth Daily., Disp: 90 tablet, Rfl: 3  •  montelukast (SINGULAIR) 10 MG tablet, Take 10 mg by mouth Every Night., Disp: , Rfl:   •  Multiple Vitamin (MULTI VITAMIN DAILY PO), Take 1 tablet by mouth daily. Kemar green, Disp: , Rfl:   •  Nintedanib Esylate 150 MG capsule, Take 1 tablet by mouth 2 (two) times a day., Disp: , Rfl:   •  olopatadine (PATANASE) 0.6 % solution nasal solution, USE 1 SPRAY(S) IN EACH NOSTRIL 4 TIMES DAILY AS NEEDED, Disp: , Rfl: 3  •  Omega-3 Fatty Acids (FISH OIL) 600 MG capsule, Take 600 mg by mouth., Disp: , Rfl:   •  Saw Palmetto extract, , Disp: , Rfl:   •  simvastatin (ZOCOR) 20 MG tablet, Take 20 mg by mouth Every Other Day., Disp: , Rfl:    •  tadalafil (CIALIS) 20 MG tablet, Take 1 tablet by mouth Daily As Needed for erectile dysfunction., Disp: 8 tablet, Rfl: 5  •  testosterone (ANDROGEL) 50 MG/5GM (1%) gel gel, Place 50 mg on the skin as directed by provider Daily., Disp: 5 g, Rfl: 5  •  Turmeric 500 MG tablet, Take  by mouth., Disp: , Rfl:   •  colestipol (COLESTID) 1 g tablet, Take 1 g by mouth 2 (Two) Times a Day As Needed., Disp: , Rfl: 3  •  hydrALAZINE (APRESOLINE) 10 MG tablet, TAKE 1 TABLET BY MOUTH 4 TIMES DAILY WITH FOOD, Disp: , Rfl: 0        History of Present Illness    Patient is a 64-year-old  male who we are seeing today for further evaluation of chest pain and palpitations.  He has no previous history of coronary disease.  He notes that over the last 3 to 4 months he has had some recurrent fluttering sensations in his chest.  These will typically last for about a minute and then resolved.  On November 12 he had a significant episode while sitting at a computer of tachypalpitations with associated pounding in his neck.  This lasted for about 4 to 5 minutes and then resolved.  Afterwards he felt fatigued and did not feel well for a few days.  He then started to notice some recurrent pounding sensation in his neck.  He checked his blood pressure and it was noted be elevated with systolic blood pressures in the 180s to 190s.  He notes his wife was getting ready to travel out of town for a few days so he went to the emergency department to be evaluated.  There he was treated with medications.  He ruled out for myocardial infarction and was instructed to follow-up with a cardiologist.  Since that time blood pressure has been relatively controlled.  He was given a prescription for hydralazine in the ER which he is taken may be twice.  He has not had any recurrent tachypalpitations.  Still gets an occasional discomfort in his upper mid chest.  No specific triggering factors noted.    The following portions of the patient's  "history were reviewed and updated as appropriate: allergies, current medications, past family history, past medical history, past social history, past surgical history and problem list.    Family History   Problem Relation Age of Onset   • Lymphoma Mother    • Coronary artery disease Father    • Diabetes Father    • Lung cancer Father        Social History     Tobacco Use   • Smoking status: Never Smoker   • Smokeless tobacco: Never Used   Substance Use Topics   • Alcohol use: Yes     Alcohol/week: 1.2 oz     Types: 1 Cans of beer per week     Comment: monthly?   • Drug use: No         Review of Systems   Constitution: Positive for malaise/fatigue. Negative for fever and weakness.   HENT: Negative for nosebleeds.    Eyes: Negative for redness and visual disturbance.   Cardiovascular: Positive for palpitations. Negative for orthopnea and paroxysmal nocturnal dyspnea.   Respiratory: Negative for cough, snoring, sputum production and wheezing.    Hematologic/Lymphatic: Negative for bleeding problem.   Skin: Negative for flushing, itching and rash.   Musculoskeletal: Negative for falls, joint pain and muscle cramps.   Gastrointestinal: Positive for change in bowel habit. Negative for abdominal pain, diarrhea, heartburn, nausea and vomiting.   Genitourinary: Negative for hematuria.   Neurological: Negative for excessive daytime sleepiness, dizziness, headaches and tremors.   Psychiatric/Behavioral: Negative for substance abuse. The patient is not nervous/anxious.           Objective:    height is 195.6 cm (77\") and weight is 122 kg (269 lb). His blood pressure is 146/87 and his pulse is 65.         Physical Exam   Constitutional: He is oriented to person, place, and time. He appears well-developed and well-nourished.   HENT:   Head: Normocephalic and atraumatic.   Mouth/Throat: Oropharynx is clear and moist.   Eyes: Conjunctivae are normal. Pupils are equal, round, and reactive to light.   Neck: Normal carotid pulses and " no JVD present. Carotid bruit is not present. No thyromegaly present.   Cardiovascular: Normal rate, regular rhythm, S1 normal and S2 normal. Exam reveals no gallop and no friction rub.   No murmur heard.  Pulses:       Carotid pulses are 2+ on the right side, and 2+ on the left side.       Dorsalis pedis pulses are 2+ on the right side, and 2+ on the left side.        Posterior tibial pulses are 2+ on the right side, and 2+ on the left side.   Pulmonary/Chest: No respiratory distress. He has no wheezes. He has rales in the right lower field. He exhibits no tenderness.   Abdominal: He exhibits no distension, no abdominal bruit and no mass. There is no hepatosplenomegaly. There is no tenderness. There is no rebound.   Musculoskeletal: He exhibits no edema, tenderness or deformity.   Lymphadenopathy:     He has no cervical adenopathy.   Neurological: He is alert and oriented to person, place, and time. He has normal strength.   Skin: Skin is warm and dry. No rash noted. No cyanosis. Nails show no clubbing.   Psychiatric: He has a normal mood and affect. Cognition and memory are normal.         ECG 12 Lead  Date/Time: 12/3/2019 9:38 AM  Performed by: Jagjit Riley MD  Authorized by: Jagjit Riley MD   Comparison: compared with previous ECG from 11/12/2019  Similar to previous ECG  Rhythm: sinus rhythm    Clinical impression: normal ECG                  Assessment:   Assessment/Plan      Ramirez was seen today for rapid heart rate.    Diagnoses and all orders for this visit:    Precordial chest pain  -     ECG 12 Lead    Essential hypertension    Mixed hyperlipidemia    Palpitations      1.  Palpitations, single episode.  Seems to be exacerbated by high blood pressure.  Event monitor pending.  2.  Dyspnea on exertion likely due to IPF.  Need to rule out ischemic heart disease given his multiple coronary risk factors.  3.  Hypertension, was elevated earlier, is better controlled now.  4.  Dyslipidemia  5.   "\"Borderline\" diabetes    Recommendations  1.  Patient scheduled for 14-day monitor, agree with this.  He understands if he has had no further spells on his monitor, we will need to catch any future spells.  Discussed with him to seek medical attention during an episode.  2.  Agree with need for echocardiogram and exercise Cardiolite.  We will help schedule.  3.  Discussed that his symptoms may be due to hypertension, that triggered his arrhythmia.  Agree with recent medical changes.  Continue to monitor blood pressure to ensure systolic is less than 140.  Need addition of beta-blocker or calcium channel blocker.  Or.  Further recommendations after the above testing.       Lalita SONG scribed portions of this dictation for Dr. Jagjit Riley.    Dictated utilizing Dragon dictation  "

## 2019-12-12 ENCOUNTER — OFFICE VISIT (OUTPATIENT)
Dept: FAMILY MEDICINE CLINIC | Facility: CLINIC | Age: 64
End: 2019-12-12

## 2019-12-12 VITALS
BODY MASS INDEX: 32 KG/M2 | TEMPERATURE: 98.3 F | RESPIRATION RATE: 16 BRPM | HEART RATE: 84 BPM | DIASTOLIC BLOOD PRESSURE: 70 MMHG | SYSTOLIC BLOOD PRESSURE: 150 MMHG | HEIGHT: 77 IN | WEIGHT: 271 LBS

## 2019-12-12 DIAGNOSIS — J01.90 ACUTE NON-RECURRENT SINUSITIS, UNSPECIFIED LOCATION: Primary | ICD-10-CM

## 2019-12-12 DIAGNOSIS — J84.10 PULMONARY FIBROSIS (HCC): ICD-10-CM

## 2019-12-12 DIAGNOSIS — R50.9 FEVER, UNSPECIFIED FEVER CAUSE: ICD-10-CM

## 2019-12-12 LAB
EXPIRATION DATE: NORMAL
FLUAV AG NPH QL: NEGATIVE
FLUBV AG NPH QL: NEGATIVE
INTERNAL CONTROL: NORMAL
Lab: NORMAL

## 2019-12-12 PROCEDURE — 87804 INFLUENZA ASSAY W/OPTIC: CPT | Performed by: FAMILY MEDICINE

## 2019-12-12 PROCEDURE — 99213 OFFICE O/P EST LOW 20 MIN: CPT | Performed by: FAMILY MEDICINE

## 2019-12-12 RX ORDER — OSELTAMIVIR PHOSPHATE 75 MG/1
75 CAPSULE ORAL DAILY
Qty: 10 CAPSULE | Refills: 0 | Status: SHIPPED | OUTPATIENT
Start: 2019-12-12 | End: 2020-06-29

## 2019-12-12 RX ORDER — CEFDINIR 300 MG/1
300 CAPSULE ORAL 2 TIMES DAILY
Qty: 14 CAPSULE | Refills: 0 | Status: SHIPPED | OUTPATIENT
Start: 2019-12-12 | End: 2020-06-24

## 2019-12-12 RX ORDER — METHYLPREDNISOLONE 4 MG/1
TABLET ORAL
Qty: 1 TABLET | Refills: 0 | Status: SHIPPED | OUTPATIENT
Start: 2019-12-12 | End: 2020-06-24

## 2019-12-12 NOTE — PROGRESS NOTES
Subjective   Ramirez Fernandez is a 64 y.o. male.   Chief Complaint   Patient presents with   • Fever     started yesterday    • cough, chest congestion     History of Present Illness     Cough and chest congestion began yesterday; ST Monday evening and yesterday moved into head and some chest. No purulent sputum. Poor sleep due to his coughing.  Feels like his breathing is about at baseline    Exposed to flu last weekend.  Very concerned given his pulmonary fibrosis history.    Subjective (thermometer was old but his temperature did go up to degrees from its baseline).    No GI symptoms.  In fact, he generally suffers from some chronic diarrhea.  He tried a half of a tablet of Imodium once or twice a day and has had very good success.    The following portions of the patient's history were reviewed and updated as appropriate: allergies, current medications, past medical history, past social history and problem list.    Review of Systems   Constitutional: Positive for fever. Negative for chills.   HENT: Positive for congestion, postnasal drip, sinus pressure, sneezing and sore throat. Negative for ear pain and trouble swallowing.    Eyes: Negative.    Respiratory: Positive for cough. Negative for wheezing.    Cardiovascular: Negative for palpitations and leg swelling.   Gastrointestinal: Negative for diarrhea (on 1/2 Immodium bid).   Neurological: Negative for headache.       Objective   Physical Exam   Constitutional: He is oriented to person, place, and time. He appears well-developed and well-nourished. No distress.   HENT:   Right Ear: External ear normal.   Left Ear: External ear normal.   TMs retracted bilaterally w/o A/F level, +PND; obvious nasal congestion   Eyes: Conjunctivae are normal.   Neck: Neck supple.   Cardiovascular: Normal rate and regular rhythm.   Pulmonary/Chest: Effort normal and breath sounds normal. He has no wheezes. He has no rales.   Lymphadenopathy:     He has no cervical adenopathy.    Neurological: He is alert and oriented to person, place, and time.   Skin: Skin is warm and dry. No rash noted.   Psychiatric: He has a normal mood and affect.   Nursing note and vitals reviewed.        Assessment/Plan   Ramirez was seen today for fever and cough, chest congestion.    Diagnoses and all orders for this visit:    Acute non-recurrent sinusitis, unspecified location  -     methylPREDNISolone (MEDROL, VINCENT,) 4 MG tablet; Take as directed on package instructions.  -     Discontinue: HYDROcodone-homatropine (HYCODAN) 5-1.5 MG/5ML syrup; Take 5 mL by mouth Every 6 (Six) Hours As Needed for Cough.  -     cefdinir (OMNICEF) 300 MG capsule; Take 1 capsule by mouth 2 (Two) Times a Day.  -     HYDROcodone-homatropine (HYCODAN) 5-1.5 MG/5ML syrup; Take 5 mL by mouth Every 6 (Six) Hours As Needed for Cough.    Fever, unspecified fever cause  -     POC Influenza A / B    Pulmonary fibrosis (CMS/HCC)    Other orders  -     oseltamivir (TAMIFLU) 75 MG capsule; Take 1 capsule by mouth Daily.    Negative flu test today.  Current infection may be viral, however with his pulmonary fibrosis I am uncomfortable leaving him on coverage for bacterial sources.  Omnicef as noted above.    Will also give him Tamiflu to have on hand through the winter.  Explained to him the dose of daily for 10 days as a preventative if he gets a strong exposure to the influenza virus.  1 capsule twice daily for 5 days if he is acutely ill with fever.  Call with questions.         Daniel Cruz MD  12/12/2019

## 2020-01-06 RX ORDER — SIMVASTATIN 20 MG
TABLET ORAL
Qty: 90 TABLET | Refills: 0 | Status: SHIPPED | OUTPATIENT
Start: 2020-01-06 | End: 2020-04-06

## 2020-01-08 ENCOUNTER — HOSPITAL ENCOUNTER (OUTPATIENT)
Dept: CARDIOLOGY | Facility: HOSPITAL | Age: 65
Discharge: HOME | End: 2020-01-08

## 2020-01-08 ENCOUNTER — HOSPITAL ENCOUNTER (OUTPATIENT)
Dept: CARDIOLOGY | Facility: HOSPITAL | Age: 65
Discharge: HOME | End: 2020-01-08
Admitting: INTERNAL MEDICINE

## 2020-01-08 DIAGNOSIS — R07.2 PRECORDIAL CHEST PAIN: ICD-10-CM

## 2020-01-08 DIAGNOSIS — R00.2 PALPITATIONS: ICD-10-CM

## 2020-01-08 PROCEDURE — 78452 HT MUSCLE IMAGE SPECT MULT: CPT

## 2020-01-08 PROCEDURE — 0 TECHNETIUM SESTAMIBI: Performed by: INTERNAL MEDICINE

## 2020-01-08 PROCEDURE — 78452 HT MUSCLE IMAGE SPECT MULT: CPT | Performed by: INTERNAL MEDICINE

## 2020-01-08 PROCEDURE — 93017 CV STRESS TEST TRACING ONLY: CPT

## 2020-01-08 PROCEDURE — A9500 TC99M SESTAMIBI: HCPCS | Performed by: INTERNAL MEDICINE

## 2020-01-08 PROCEDURE — 93018 CV STRESS TEST I&R ONLY: CPT | Performed by: INTERNAL MEDICINE

## 2020-01-08 PROCEDURE — 93306 TTE W/DOPPLER COMPLETE: CPT | Performed by: INTERNAL MEDICINE

## 2020-01-08 PROCEDURE — 93306 TTE W/DOPPLER COMPLETE: CPT

## 2020-01-08 RX ADMIN — TECHNETIUM TC 99M SESTAMIBI 1 DOSE: 1 INJECTION INTRAVENOUS at 07:40

## 2020-01-08 RX ADMIN — TECHNETIUM TC 99M SESTAMIBI 1 DOSE: 1 INJECTION INTRAVENOUS at 09:10

## 2020-01-09 LAB
BH CV ECHO MEAS - AO ROOT AREA (BSA CORRECTED): 0.99
BH CV ECHO MEAS - AO ROOT AREA: 5 CM^2
BH CV ECHO MEAS - AO ROOT DIAM: 2.5 CM
BH CV ECHO MEAS - BSA(HAYCOCK): 2.6 M^2
BH CV ECHO MEAS - BSA: 2.5 M^2
BH CV ECHO MEAS - BZI_BMI: 32.1 KILOGRAMS/M^2
BH CV ECHO MEAS - BZI_METRIC_HEIGHT: 195.6 CM
BH CV ECHO MEAS - BZI_METRIC_WEIGHT: 122.9 KG
BH CV ECHO MEAS - EDV(CUBED): 124.7 ML
BH CV ECHO MEAS - EDV(TEICH): 118 ML
BH CV ECHO MEAS - EF(CUBED): 80.2 %
BH CV ECHO MEAS - EF(TEICH): 72.5 %
BH CV ECHO MEAS - ESV(CUBED): 24.6 ML
BH CV ECHO MEAS - ESV(TEICH): 32.5 ML
BH CV ECHO MEAS - FS: 41.8 %
BH CV ECHO MEAS - IVS/LVPW: 1.1
BH CV ECHO MEAS - IVSD: 1.1 CM
BH CV ECHO MEAS - LA DIMENSION: 3.9 CM
BH CV ECHO MEAS - LA/AO: 1.5
BH CV ECHO MEAS - LAD MAJOR: 5.3 CM
BH CV ECHO MEAS - LAT PEAK E' VEL: 7.1 CM/SEC
BH CV ECHO MEAS - LATERAL E/E' RATIO: 9.1
BH CV ECHO MEAS - LV MASS(C)D: 197.1 GRAMS
BH CV ECHO MEAS - LV MASS(C)DI: 77.4 GRAMS/M^2
BH CV ECHO MEAS - LVIDD: 5 CM
BH CV ECHO MEAS - LVIDS: 2.9 CM
BH CV ECHO MEAS - LVPWD: 1 CM
BH CV ECHO MEAS - MED PEAK E' VEL: 8.6 CM/SEC
BH CV ECHO MEAS - MEDIAL E/E' RATIO: 7.6
BH CV ECHO MEAS - MV A MAX VEL: 101.7 CM/SEC
BH CV ECHO MEAS - MV DEC SLOPE: 444.8 CM/SEC^2
BH CV ECHO MEAS - MV DEC TIME: 0.23 SEC
BH CV ECHO MEAS - MV E MAX VEL: 66.1 CM/SEC
BH CV ECHO MEAS - MV E/A: 0.65
BH CV ECHO MEAS - MV P1/2T MAX VEL: 97.7 CM/SEC
BH CV ECHO MEAS - MV P1/2T: 64.4 MSEC
BH CV ECHO MEAS - MVA P1/2T LCG: 2.3 CM^2
BH CV ECHO MEAS - MVA(P1/2T): 3.4 CM^2
BH CV ECHO MEAS - PA ACC SLOPE: 628.8 CM/SEC^2
BH CV ECHO MEAS - PA ACC TIME: 0.12 SEC
BH CV ECHO MEAS - PA PR(ACCEL): 23.5 MMHG
BH CV ECHO MEAS - PI END-D VEL: 164.2 CM/SEC
BH CV ECHO MEAS - RV MAX PG: 1.8 MMHG
BH CV ECHO MEAS - RV V1 MAX: 67.6 CM/SEC
BH CV ECHO MEAS - SI(CUBED): 39.3 ML/M^2
BH CV ECHO MEAS - SI(TEICH): 33.6 ML/M^2
BH CV ECHO MEAS - SV(CUBED): 100 ML
BH CV ECHO MEAS - SV(TEICH): 85.5 ML
BH CV ECHO MEAS - TAPSE (>1.6): 2.3 CM2
BH CV ECHO MEASUREMENTS AVERAGE E/E' RATIO: 8.42
BH CV XLRA - RV BASE: 4.8 CM
BH CV XLRA - RV LENGTH: 7.3 CM
BH CV XLRA - RV MID: 4.3 CM
BH CV XLRA - TDI S': 12.3 CM/SEC
LEFT ATRIUM VOLUME INDEX: 20 ML/M^2
LEFT ATRIUM VOLUME: 51 ML
LV EF 2D ECHO EST: 60 %
MAXIMAL PREDICTED HEART RATE: 158 BPM
STRESS TARGET HR: 134 BPM

## 2020-01-10 LAB
BH CV STRESS BP STAGE 1: NORMAL
BH CV STRESS BP STAGE 2: NORMAL
BH CV STRESS DURATION MIN STAGE 1: 3
BH CV STRESS DURATION MIN STAGE 2: 2
BH CV STRESS DURATION SEC STAGE 1: 0
BH CV STRESS DURATION SEC STAGE 2: 30
BH CV STRESS GRADE STAGE 1: 10
BH CV STRESS GRADE STAGE 2: 12
BH CV STRESS HR STAGE 1: 123
BH CV STRESS HR STAGE 2: 151
BH CV STRESS METS STAGE 1: 5
BH CV STRESS METS STAGE 2: 7.5
BH CV STRESS O2 STAGE 1: 97
BH CV STRESS O2 STAGE 2: 90
BH CV STRESS PROTOCOL 1: NORMAL
BH CV STRESS RECOVERY BP: NORMAL MMHG
BH CV STRESS RECOVERY HR: 96 BPM
BH CV STRESS SPEED STAGE 1: 1.7
BH CV STRESS SPEED STAGE 2: 2.5
BH CV STRESS STAGE 1: 1
BH CV STRESS STAGE 2: 2
LV EF NUC BP: 72 %
MAXIMAL PREDICTED HEART RATE: 158 BPM
PERCENT MAX PREDICTED HR: 95.57 %
STRESS BASELINE BP: NORMAL MMHG
STRESS BASELINE HR: 94 BPM
STRESS O2 SAT REST: 99 %
STRESS PERCENT HR: 112 %
STRESS POST ESTIMATED WORKLOAD: 6.8 METS
STRESS POST EXERCISE DUR MIN: 5 MIN
STRESS POST EXERCISE DUR SEC: 30 SEC
STRESS POST PEAK BP: NORMAL MMHG
STRESS POST PEAK HR: 151 BPM
STRESS TARGET HR: 134 BPM

## 2020-01-13 ENCOUNTER — TELEPHONE (OUTPATIENT)
Dept: CARDIOLOGY | Facility: CLINIC | Age: 65
End: 2020-01-13

## 2020-01-13 RX ORDER — BISOPROLOL FUMARATE 5 MG/1
2.5 TABLET, FILM COATED ORAL DAILY
Qty: 30 TABLET | Refills: 11 | Status: SHIPPED | OUTPATIENT
Start: 2020-01-13 | End: 2021-12-08

## 2020-01-13 NOTE — TELEPHONE ENCOUNTER
Advised patient of below, understanding verbalized.    ----- Message from Jagjit Riley MD sent at 1/10/2020  4:21 PM EST -----  Stress test does not suggest any ischemia.  Normal echo.  Did have frequent PVCs during exercise.  Would start bisoprolol 2.5 mg daily.

## 2020-02-25 ENCOUNTER — OFFICE VISIT (OUTPATIENT)
Dept: PULMONOLOGY | Facility: CLINIC | Age: 65
End: 2020-02-25

## 2020-02-25 VITALS
HEIGHT: 77 IN | BODY MASS INDEX: 31.53 KG/M2 | SYSTOLIC BLOOD PRESSURE: 130 MMHG | TEMPERATURE: 97.7 F | OXYGEN SATURATION: 96 % | WEIGHT: 267 LBS | DIASTOLIC BLOOD PRESSURE: 82 MMHG | HEART RATE: 67 BPM

## 2020-02-25 DIAGNOSIS — J84.10 PULMONARY FIBROSIS (HCC): Primary | ICD-10-CM

## 2020-02-25 PROCEDURE — 94010 BREATHING CAPACITY TEST: CPT | Performed by: INTERNAL MEDICINE

## 2020-02-25 PROCEDURE — 99212 OFFICE O/P EST SF 10 MIN: CPT | Performed by: INTERNAL MEDICINE

## 2020-02-25 NOTE — PROGRESS NOTES
Subjective   Chief Complaint   Patient presents with   • Pulmonary Fibrosis       Ramirez Fernandez is a 64 y.o. male     History of Present Illness 64-year-old male with biopsy-proven pulmonary fibrosis he had a biopsy December 2010 and has been followed in the office since early 2011 initially treated with a tapering dose of prednisone and maintained on alternative the prednisone for quite some time however for the last few years she has been on OFEV 150 mg twice a day and also takes blood pressure medications and being followed by his family physician in [place]     .  He has used albuterol by nebulizer and felt that it probably helped him                                                                    Review of Systems minimal cough not much shortness of breath that he has oxygen that he rarely uses used to go on the treadmill and use oxygen that can help he does not exercise on treadmill because of bad knees but uses his oxygen according to his pulse ox    Family History   Problem Relation Age of Onset   • Lymphoma Mother    • Coronary artery disease Father    • Diabetes Father    • Lung cancer Father        Past Medical History:   Diagnosis Date   • Allergic rhinitis    • Asthma    • Cataract 2016   • Elevated WBC count    • Erectile dysfunction 2018   • Hyperlipidemia    • Hypertension    • Idiopathic pulmonary fibrosis (CMS/HCC)    • Palpitations    • Polyp of sigmoid colon    • Raynaud's disease    • Tinea cruris    • Type 2 diabetes mellitus (CMS/HCC)        Past Surgical History:   Procedure Laterality Date   • CHOLECYSTECTOMY  1996   • COLONOSCOPY     • EAR TUBES Bilateral      Ear Pressure Equalization Tube, Insertion        Social History     Socioeconomic History   • Marital status:      Spouse name: Not on file   • Number of children: Not on file   • Years of education: Not on file   • Highest education level: Not on file   Tobacco Use   • Smoking status: Never Smoker   • Smokeless  tobacco: Never Used   Substance and Sexual Activity   • Alcohol use: Yes     Alcohol/week: 2.0 standard drinks     Types: 1 Cans of beer per week     Comment: monthly?   • Drug use: No        Physical Exam: No acute distress vital signs are stable obvious clubbing and basal crackling sounds as usual his heart is regular he has no cyanosis    PFT: Spirometry FVC of 30 FEV1 32% his last complete PFT was September 2019 at  FVC 38% FEV1 31% ratio of 61 total lung capacity 47 and normal diffusion capacity    Imaging: Last chest x-ray here was March 14, 2017 that decreased lung volumes increased lung markings some basilar platelike atelectasis is mostly on the left lower lung area    Other Labs:       ASSESSMENT pulmonary fibrosis with mild desaturation with exercise seemingly stable on OFEV        Recommendations: Continue current medications wrote a prescription for Ventolin inhaler to use as needed on exertion explained to him that his problem is not obstructive process and it is restrictive impairment    Follow up: Return in 6 months with chest x-ray and PFT in the office or earlier as needed

## 2020-03-23 ENCOUNTER — RESULTS ENCOUNTER (OUTPATIENT)
Dept: FAMILY MEDICINE CLINIC | Facility: CLINIC | Age: 65
End: 2020-03-23

## 2020-03-23 DIAGNOSIS — I10 ESSENTIAL HYPERTENSION: ICD-10-CM

## 2020-03-23 DIAGNOSIS — E11.9 TYPE 2 DIABETES MELLITUS WITHOUT COMPLICATION, WITHOUT LONG-TERM CURRENT USE OF INSULIN (HCC): ICD-10-CM

## 2020-03-23 DIAGNOSIS — N52.8 OTHER MALE ERECTILE DYSFUNCTION: ICD-10-CM

## 2020-03-23 DIAGNOSIS — E78.2 MIXED HYPERLIPIDEMIA: ICD-10-CM

## 2020-03-23 DIAGNOSIS — E29.1 HYPOGONADISM MALE: ICD-10-CM

## 2020-04-06 RX ORDER — SIMVASTATIN 20 MG
TABLET ORAL
Qty: 90 TABLET | Refills: 0 | Status: SHIPPED | OUTPATIENT
Start: 2020-04-06 | End: 2020-09-21

## 2020-06-24 ENCOUNTER — TELEPHONE (OUTPATIENT)
Dept: FAMILY MEDICINE CLINIC | Facility: CLINIC | Age: 65
End: 2020-06-24

## 2020-06-24 PROBLEM — E29.1 HYPOGONADISM MALE: Status: ACTIVE | Noted: 2020-06-24

## 2020-06-24 NOTE — PROGRESS NOTES
Subjective   Ramirez Fernandez is a 64 y.o. male.   Chief Complaint   Patient presents with   • Follow-up     NOTE: pt is fasting for labs / does not need any RF's today    • Diabetes   • Hypertension   • Hyperlipidemia   • Pulm Fibrosis (see's Pulm)     History of Present Illness     Patient returns today for follow-up of chronic medical conditions as noted above.      At his last visit with me regarding his diabetes, his A1c was stable to slightly improved down to 6.2%.  His LDL was stable at 102 with a goal less than 70.  At that time I increased his simvastatin to 20 mg daily from every other day.  We did see some nice improvement in his LDL down to 81 mg/dL.    Fortunately, that is about the only thing that has improved.  His testosterone level remains essentially unchanged/low.  He had some diminishment in his GFR.  And he had some slight transaminase increases as we have seen in the past.    The biggest issue was we had a significant worsening in his blood sugars.  His diabetes shows an A1c now 7.5%. Doesn't shock him. COVID shutdown. Starting back the last couple of weeks walking outside.    Also at that visit in October we found that he had low testosterone.  He was also symptomatic with erectile dysfunction and some mild fatigue.  I recommended starting him on some testosterone supplementation and recheck PSA and testosterone level.    In general, over the last several weeks he has been feeling okay - not happy with A1C.    Looks like he only filled AndroGel one time.  Did not start yet. Had cardiology eval and they started him on bisoprolol 2.5 mg a day . Really helped his palpitations.     His diet and activity have generally been not great    Tweaked his back last week - 800 mg Motrin (wife's) helped    He saw his pulmonary physician to be wary.Dr. Carter felt that he was stable in February and simply added a Ventolin inhaler for as needed use with exercise.    The following portions of the patient's  history were reviewed and updated as appropriate: allergies, current medications, past medical history, past social history and problem list.    Review of Systems   Constitutional: Negative for fever and unexpected weight loss.   HENT: Negative.    Eyes: Negative for visual disturbance.   Respiratory: Positive for shortness of breath (chronic).    Cardiovascular: Negative.  Negative for chest pain and leg swelling.   Gastrointestinal: Negative.    Endocrine: Negative for polyuria.   Skin: Negative.    Neurological: Negative.    Psychiatric/Behavioral: Negative.        Objective   Physical Exam   Constitutional: He is oriented to person, place, and time. He appears well-developed and well-nourished. No distress.   Eyes: Conjunctivae are normal. No scleral icterus.   Neck: Neck supple. No JVD present.   Cardiovascular: Normal rate and regular rhythm.   Pulmonary/Chest: Effort normal and breath sounds normal. He has no wheezes. He has no rales.   Neurological: He is alert and oriented to person, place, and time.   Skin: Skin is warm and dry.   Psychiatric: He has a normal mood and affect. Thought content normal.   Nursing note and vitals reviewed.        Assessment/Plan   Ramirez was seen today for follow-up, diabetes, hypertension, hyperlipidemia and pulm fibrosis (see's pulm).    Diagnoses and all orders for this visit:    Type 2 diabetes mellitus without complication, without long-term current use of insulin (CMS/[facility], located [facility])  Comments:  Start metformin as noted.  Avoid processed foods try to adopt a more plant-based diet and recheck A1c in 2 months office visit in 4  Orders:  -     Hemoglobin A1c; Future  -     metFORMIN (GLUCOPHAGE) 1000 MG tablet; One half p.o. twice daily with food for 2 weeks then 1 p.o. twice daily.    Essential hypertension  Comments:  Adequate control.  Continue current medications.  Orders:  -     Comprehensive Metabolic Panel; Future    Mixed hyperlipidemia  Comments:  LDL is improved.  If he will  make dietary changes I think we will find adequate control with current dosing.  Simply recheck in 2 months    Hypogonadism male  -     Testosterone; Future    Pulmonary fibrosis (CMS/HCC)  Comments:  Keep routine follow-up with his pulmonary physician    Elevated LFTs  Comments:  Likely due to his diabetes worsening.  Recheck in 2 months with the above treatment    Renal insufficiency  Comments:  Avoid NSAIDs, stay well-hydrated and recheck in 2 months    Other orders  -     ibuprofen (ADVIL,MOTRIN) 800 MG tablet; Take 1 tablet by mouth Every 8 (Eight) Hours As Needed for Mild Pain .    Labs as noted above.  He certainly at high risk for complications with COVID.  He is aware of his need to be careful with handwashing, masking, etc.             Daniel Cruz MD  06/29/2020

## 2020-06-25 ENCOUNTER — LAB (OUTPATIENT)
Dept: FAMILY MEDICINE CLINIC | Facility: CLINIC | Age: 65
End: 2020-06-25

## 2020-06-26 LAB
ALBUMIN SERPL-MCNC: 4.4 G/DL (ref 3.5–5.2)
ALBUMIN/GLOB SERPL: 1.8 G/DL
ALP SERPL-CCNC: 61 U/L (ref 39–117)
ALT SERPL-CCNC: 59 U/L (ref 1–41)
AST SERPL-CCNC: 56 U/L (ref 1–40)
BILIRUB SERPL-MCNC: 0.6 MG/DL (ref 0.2–1.2)
BUN SERPL-MCNC: 21 MG/DL (ref 8–23)
BUN/CREAT SERPL: 17.2 (ref 7–25)
CALCIUM SERPL-MCNC: 9.3 MG/DL (ref 8.6–10.5)
CHLORIDE SERPL-SCNC: 101 MMOL/L (ref 98–107)
CHOLEST SERPL-MCNC: 146 MG/DL (ref 0–200)
CO2 SERPL-SCNC: 27.6 MMOL/L (ref 22–29)
CREAT SERPL-MCNC: 1.22 MG/DL (ref 0.76–1.27)
GLOBULIN SER CALC-MCNC: 2.4 GM/DL
GLUCOSE SERPL-MCNC: 148 MG/DL (ref 65–99)
HBA1C MFR BLD: 7.5 % (ref 4.8–5.6)
HDLC SERPL-MCNC: 43 MG/DL (ref 40–60)
LDLC SERPL CALC-MCNC: 81 MG/DL (ref 0–100)
LDLC/HDLC SERPL: 1.89 {RATIO}
POTASSIUM SERPL-SCNC: 4.7 MMOL/L (ref 3.5–5.2)
PROT SERPL-MCNC: 6.8 G/DL (ref 6–8.5)
PSA SERPL-MCNC: 0.44 NG/ML (ref 0–4)
SODIUM SERPL-SCNC: 139 MMOL/L (ref 136–145)
TESTOST SERPL-MCNC: 219 NG/DL (ref 264–916)
TRIGL SERPL-MCNC: 109 MG/DL (ref 0–150)
VLDLC SERPL CALC-MCNC: 21.8 MG/DL

## 2020-06-26 RX ORDER — ALBUTEROL SULFATE 90 UG/1
AEROSOL, METERED RESPIRATORY (INHALATION) SEE ADMIN INSTRUCTIONS
COMMUNITY
Start: 2020-03-31 | End: 2023-01-12

## 2020-06-29 ENCOUNTER — OFFICE VISIT (OUTPATIENT)
Dept: FAMILY MEDICINE CLINIC | Facility: CLINIC | Age: 65
End: 2020-06-29

## 2020-06-29 VITALS
BODY MASS INDEX: 31.17 KG/M2 | HEIGHT: 77 IN | DIASTOLIC BLOOD PRESSURE: 72 MMHG | TEMPERATURE: 97.8 F | WEIGHT: 264 LBS | SYSTOLIC BLOOD PRESSURE: 130 MMHG | HEART RATE: 68 BPM | RESPIRATION RATE: 16 BRPM

## 2020-06-29 DIAGNOSIS — E78.2 MIXED HYPERLIPIDEMIA: ICD-10-CM

## 2020-06-29 DIAGNOSIS — R79.89 ELEVATED LFTS: ICD-10-CM

## 2020-06-29 DIAGNOSIS — E11.9 TYPE 2 DIABETES MELLITUS WITHOUT COMPLICATION, WITHOUT LONG-TERM CURRENT USE OF INSULIN (HCC): Primary | ICD-10-CM

## 2020-06-29 DIAGNOSIS — N28.9 RENAL INSUFFICIENCY: ICD-10-CM

## 2020-06-29 DIAGNOSIS — E29.1 HYPOGONADISM MALE: ICD-10-CM

## 2020-06-29 DIAGNOSIS — J84.10 PULMONARY FIBROSIS (HCC): ICD-10-CM

## 2020-06-29 DIAGNOSIS — I10 ESSENTIAL HYPERTENSION: ICD-10-CM

## 2020-06-29 PROCEDURE — 99214 OFFICE O/P EST MOD 30 MIN: CPT | Performed by: FAMILY MEDICINE

## 2020-06-29 RX ORDER — IBUPROFEN 800 MG/1
800 TABLET ORAL EVERY 8 HOURS PRN
Qty: 30 TABLET | Refills: 0 | Status: SHIPPED | OUTPATIENT
Start: 2020-06-29 | End: 2023-01-17

## 2020-08-05 ENCOUNTER — PATIENT MESSAGE (OUTPATIENT)
Dept: FAMILY MEDICINE CLINIC | Facility: CLINIC | Age: 65
End: 2020-08-05

## 2020-08-05 DIAGNOSIS — E29.1 HYPOGONADISM MALE: ICD-10-CM

## 2020-08-05 NOTE — TELEPHONE ENCOUNTER
From: Ramirez Fernandez  To: Daniel Cruz MD  Sent: 8/5/2020 9:18 AM EDT  Subject: Prescription Question    Dr. Cruz,  If you recall, after initially prescribing my testosterone treatment, we delayed my starting it until after I had resolved my cardiac issues with Dr. Riley.    Following our discussion at my June 29 office visit, I was to start the testosterone. Due to delays on my part, I didn't actually start the initial 30 dose packet until July 27.    The original prescription indicated Qty: 150 and “May refill 3X by 4/11/2020”. Because of the delay in starting the treatment, I am now outside of the allowable window for refills.    Would you please issue another prescription for the remaining quantity needed (I’m not sure what that should be, though, since 30 + (3 x 30) doesn’t equal 150!)?    Sorry for any unnecessary confusion.  Thanks,  Chandana

## 2020-08-10 ENCOUNTER — RESULTS ENCOUNTER (OUTPATIENT)
Dept: FAMILY MEDICINE CLINIC | Facility: CLINIC | Age: 65
End: 2020-08-10

## 2020-08-10 DIAGNOSIS — E11.9 TYPE 2 DIABETES MELLITUS WITHOUT COMPLICATION, WITHOUT LONG-TERM CURRENT USE OF INSULIN (HCC): ICD-10-CM

## 2020-08-10 DIAGNOSIS — I10 ESSENTIAL HYPERTENSION: ICD-10-CM

## 2020-08-10 DIAGNOSIS — E29.1 HYPOGONADISM MALE: ICD-10-CM

## 2020-08-10 RX ORDER — TESTOSTERONE GEL, 1% 10 MG/G
50 GEL TRANSDERMAL DAILY
Qty: 5 G | Refills: 2 | Status: SHIPPED | OUTPATIENT
Start: 2020-08-10

## 2020-08-25 ENCOUNTER — OFFICE VISIT (OUTPATIENT)
Dept: PULMONOLOGY | Facility: CLINIC | Age: 65
End: 2020-08-25

## 2020-08-25 VITALS — BODY MASS INDEX: 31.29 KG/M2 | HEIGHT: 77 IN | WEIGHT: 265 LBS

## 2020-08-25 DIAGNOSIS — J84.10 PULMONARY FIBROSIS (HCC): Primary | ICD-10-CM

## 2020-08-25 PROCEDURE — 99441 PR PHYS/QHP TELEPHONE EVALUATION 5-10 MIN: CPT | Performed by: INTERNAL MEDICINE

## 2020-08-25 NOTE — PROGRESS NOTES
Subjective   Chief Complaint   Patient presents with   • pulmonary fibrosis       Ramirez Fernandez is a 64 y.o. male Mr. Trinidad gave consent for telephone contact called him Tuesday morning of August 25 talk to him on the phone for 10 minutes    History of Present Illness 64-year-old gentleman diagnosis of pulmonary fibrosis based on a biopsy of end of 2009 has been followed in the office since and has been on OFEV for the last 4 years also has hypertension allergies and sinus trouble.  Has retired with disability because of pulmonary fibrosis and is staying at home mostly for COVID precaution    Review of Systems continues to have some shortness of breath on exertion no chest pain palpitation or edema review of systems noncontributory allergies bothering him as he is not getting allergy shots a year now    Family History   Problem Relation Age of Onset   • Lymphoma Mother    • Coronary artery disease Father    • Diabetes Father    • Lung cancer Father        Past Medical History:   Diagnosis Date   • Allergic rhinitis    • Asthma    • Cataract 2016   • Elevated WBC count    • Erectile dysfunction 2018   • Hyperlipidemia    • Hypertension    • Idiopathic pulmonary fibrosis (CMS/HCC)    • Palpitations    • Polyp of sigmoid colon    • Raynaud's disease    • Tinea cruris    • Type 2 diabetes mellitus (CMS/HCC)        Past Surgical History:   Procedure Laterality Date   • CHOLECYSTECTOMY  1996   • COLONOSCOPY     • EAR TUBES Bilateral      Ear Pressure Equalization Tube, Insertion        Social History     Socioeconomic History   • Marital status:      Spouse name: Not on file   • Number of children: Not on file   • Years of education: Not on file   • Highest education level: Not on file   Tobacco Use   • Smoking status: Never Smoker   • Smokeless tobacco: Never Used   Substance and Sexual Activity   • Alcohol use: Yes     Alcohol/week: 2.0 standard drinks     Types: 1 Cans of beer per week     Comment:  monthly?   • Drug use: No        Physical Exam: No physical exam as it was telephone conversation but he has obvious clubbing on repeat examination and no cyanosis at rest with oxygen saturations dropping below 88% on ambulation did not seem in any distress on telephone conversation    PFT: Tammy shows some restrictive impairment    Imaging: X-rays over the years showed increased lung markings at the bases and some restrictive volumes relatively unchanged    Other Labs:       ASSESSMENT is tissue diagnosis November 2009 seemingly stable with mild hypoxia on ambulation        Recommendations: Continue current medications and oxygen on ambulation reminded him of getting annual flu vaccine and COVID vaccine when available    Follow up: 3 months in the office with chest x-ray or telephone call  Talk to the patient on the phone for 10 minutes and seemed to understand all instructions

## 2020-09-21 RX ORDER — SIMVASTATIN 20 MG
TABLET ORAL
Qty: 90 TABLET | Refills: 0 | Status: SHIPPED | OUTPATIENT
Start: 2020-09-21

## 2020-09-30 ENCOUNTER — FLU SHOT (OUTPATIENT)
Dept: FAMILY MEDICINE CLINIC | Facility: CLINIC | Age: 65
End: 2020-09-30

## 2020-09-30 ENCOUNTER — LAB (OUTPATIENT)
Dept: FAMILY MEDICINE CLINIC | Facility: CLINIC | Age: 65
End: 2020-09-30

## 2020-09-30 DIAGNOSIS — Z23 NEED FOR INFLUENZA VACCINATION: ICD-10-CM

## 2020-09-30 PROCEDURE — 90686 IIV4 VACC NO PRSV 0.5 ML IM: CPT | Performed by: FAMILY MEDICINE

## 2020-09-30 PROCEDURE — G0008 ADMIN INFLUENZA VIRUS VAC: HCPCS | Performed by: FAMILY MEDICINE

## 2020-10-01 LAB
ALBUMIN SERPL-MCNC: 4.6 G/DL (ref 3.5–5.2)
ALBUMIN/GLOB SERPL: 2.3 G/DL
ALP SERPL-CCNC: 68 U/L (ref 39–117)
ALT SERPL-CCNC: 42 U/L (ref 1–41)
AST SERPL-CCNC: 36 U/L (ref 1–40)
BILIRUB SERPL-MCNC: 0.4 MG/DL (ref 0–1.2)
BUN SERPL-MCNC: 17 MG/DL (ref 8–23)
BUN/CREAT SERPL: 18.3 (ref 7–25)
CALCIUM SERPL-MCNC: 9.3 MG/DL (ref 8.6–10.5)
CHLORIDE SERPL-SCNC: 98 MMOL/L (ref 98–107)
CO2 SERPL-SCNC: 29.9 MMOL/L (ref 22–29)
CREAT SERPL-MCNC: 0.93 MG/DL (ref 0.76–1.27)
GLOBULIN SER CALC-MCNC: 2 GM/DL
GLUCOSE SERPL-MCNC: 101 MG/DL (ref 65–99)
HBA1C MFR BLD: 6.5 % (ref 4.8–5.6)
POTASSIUM SERPL-SCNC: 4.2 MMOL/L (ref 3.5–5.2)
PROT SERPL-MCNC: 6.6 G/DL (ref 6–8.5)
SODIUM SERPL-SCNC: 141 MMOL/L (ref 136–145)
TESTOST SERPL-MCNC: 240 NG/DL (ref 264–916)

## 2021-05-06 ENCOUNTER — OFFICE VISIT (OUTPATIENT)
Dept: PULMONOLOGY | Facility: CLINIC | Age: 66
End: 2021-05-06

## 2021-05-06 VITALS
SYSTOLIC BLOOD PRESSURE: 168 MMHG | TEMPERATURE: 97.8 F | DIASTOLIC BLOOD PRESSURE: 72 MMHG | HEIGHT: 77 IN | OXYGEN SATURATION: 97 % | WEIGHT: 265 LBS | BODY MASS INDEX: 31.29 KG/M2 | HEART RATE: 74 BPM

## 2021-05-06 DIAGNOSIS — J84.112 IDIOPATHIC PULMONARY FIBROSIS (HCC): Primary | ICD-10-CM

## 2021-05-06 DIAGNOSIS — E66.9 OBESITY (BMI 30-39.9): ICD-10-CM

## 2021-05-06 PROCEDURE — 99214 OFFICE O/P EST MOD 30 MIN: CPT | Performed by: NURSE PRACTITIONER

## 2021-05-06 RX ORDER — VALSARTAN AND HYDROCHLOROTHIAZIDE 320; 12.5 MG/1; MG/1
TABLET, FILM COATED ORAL
COMMUNITY
Start: 2021-03-25

## 2021-05-06 RX ORDER — AZELASTINE HYDROCHLORIDE 137 UG/1
SPRAY, METERED NASAL
COMMUNITY
Start: 2021-03-27

## 2021-05-06 RX ORDER — TESTOSTERONE 16.2 MG/G
GEL TRANSDERMAL
COMMUNITY
Start: 2021-04-04

## 2021-05-06 NOTE — PROGRESS NOTES
"Chief Complaint  Pulmonary Fibrosis    Subjective          Ramirez Fernandez presents to [facility] PULMONARY AND CRITICAL CARE MEDICINE  History of Present Illness     Mr. Fernandez is a 65 year old male with a medical history significant for pulmonary fibrosis, hyperlipidemia, hypertension and diabetes.    He presents today for routine follow-up on pulmonary fibrosis.  He states that overall he has been doing well since his last visit.  He states that he has been walking a few days a week and intermittently uses supplemental oxygen upon ambulation.  He states that he remains on Ofev and uses a rescue inhaler as needed.  He states that his shortness of breath has been at baseline.    Objective   Vital Signs:   /72   Pulse 74   Temp 97.8 °F (36.6 °C) (Temporal)   Ht 195.6 cm (77\")   Wt 120 kg (265 lb)   SpO2 97%   BMI 31.42 kg/m²     Physical Exam     GENERAL APPEARANCE: Well developed, well nourished, alert and cooperative, and appears to be in no acute distress.    HEAD: normocephalic. Atraumatic.    EYES: PERRL, EOMI. Vision is grossly intact.    THROAT: Oral cavity and pharynx normal. No inflammation, swelling, exudate, or lesions.     NECK: Neck supple.  No thyromegaly.    CARDIAC: Normal S1 and S2. No S3, S4 or murmurs. Rhythm is regular.     RESPIRATORY:Bilateral air entry positive. Bilateral diminished breath sounds. No wheezing, crackles or rhonchi noted.    GI: Positive bowel sounds. Soft, nondistended, nontender.     MUSCULOSKELETAL: No significant deformity or joint abnormality. No edema. Peripheral pulses intact. No varicosities.    NEUROLOGICAL: Strength and sensation symmetric and intact throughout.     PSYCHIATRIC: The mental examination revealed the patient was oriented to person, place, and time.     Result Review :   The following data was reviewed by: DUNCAN Arana on 05/06/2021:  Common labs    Common Labsle 6/25/20 6/25/20 6/25/20 6/25/20 9/30/20 " 9/30/20    0840 0840 0840 0840 0920 0920   Glucose  148 (A)   101 (A)    BUN  21   17    Creatinine  1.22   0.93    eGFR Non  Am  60 (A)   82    eGFR  Am  73   100    Sodium  139   141    Potassium  4.7   4.2    Chloride  101   98    Calcium  9.3   9.3    Total Protein  6.8   6.6    Albumin  4.40   4.60    Total Bilirubin  0.6   0.4    Alkaline Phosphatase  61   68    AST (SGOT)  56 (A)   36    ALT (SGPT)  59 (A)   42 (A)    Total Cholesterol   146      Triglycerides   109      HDL Cholesterol   43      LDL Cholesterol    81      Hemoglobin A1C    7.50 (A)  6.50 (A)   PSA 0.443        (A) Abnormal value       Comments are available for some flowsheets but are not being displayed.                  Assessment and Plan    Diagnoses and all orders for this visit:    1. Idiopathic pulmonary fibrosis (CMS/HCC) (Primary)  -     Oxygen Therapy    2. Obesity (BMI 30-39.9)         Pulmonary fibrosis:  -We will continue Ofev.  Refills will be due in June 2021.  -Continue use of supplemental oxygen at night and as needed.  -Continue use of albuterol every 4 hours as needed.  Refill sent to pharmacy.      He states that he needs a new prescription for his oxygen sent to his new DME company.       Patient's (Body mass index is 31.42 kg/m².) indicates that they are obese (BMI >30) with obesity-related health conditions that include hypertension and diabetes mellitus . Obesity is unchanged. BMI is is above average; BMI management plan is completed. We discussed portion control and increasing exercise.            Follow Up   Return in about 6 months (around 11/6/2021).  Patient was given instructions and counseling regarding his condition or for health maintenance advice. Please see specific information pulled into the AVS if appropriate.

## 2021-05-10 RX ORDER — AMLODIPINE BESYLATE 5 MG/1
TABLET ORAL
COMMUNITY
Start: 2021-04-08

## 2021-05-10 RX ORDER — METFORMIN HYDROCHLORIDE 500 MG/1
TABLET, EXTENDED RELEASE ORAL
COMMUNITY
Start: 2021-04-07 | End: 2021-12-08

## 2021-07-26 RX ORDER — NINTEDANIB 150 MG/1
150 CAPSULE ORAL 2 TIMES DAILY
Qty: 60 CAPSULE | Refills: 11 | Status: SHIPPED | OUTPATIENT
Start: 2021-07-26

## 2021-12-08 ENCOUNTER — OFFICE VISIT (OUTPATIENT)
Dept: PULMONOLOGY | Facility: CLINIC | Age: 66
End: 2021-12-08

## 2021-12-08 VITALS
BODY MASS INDEX: 28.93 KG/M2 | TEMPERATURE: 97.8 F | SYSTOLIC BLOOD PRESSURE: 128 MMHG | OXYGEN SATURATION: 96 % | HEIGHT: 77 IN | WEIGHT: 245 LBS | HEART RATE: 64 BPM | DIASTOLIC BLOOD PRESSURE: 74 MMHG

## 2021-12-08 DIAGNOSIS — J84.112 IDIOPATHIC PULMONARY FIBROSIS (HCC): Primary | ICD-10-CM

## 2021-12-08 PROCEDURE — 99213 OFFICE O/P EST LOW 20 MIN: CPT | Performed by: NURSE PRACTITIONER

## 2021-12-08 RX ORDER — MAG HYDROX/ALUMINUM HYD/SIMETH 400-400-40
900 SUSPENSION, ORAL (FINAL DOSE FORM) ORAL
COMMUNITY

## 2021-12-08 RX ORDER — ALBUTEROL SULFATE 2.5 MG/3ML
2.5 SOLUTION RESPIRATORY (INHALATION) 4 TIMES DAILY PRN
Qty: 360 ML | Refills: 5 | Status: SHIPPED | OUTPATIENT
Start: 2021-12-08 | End: 2023-01-12

## 2021-12-08 RX ORDER — DIPHENOXYLATE HYDROCHLORIDE AND ATROPINE SULFATE 2.5; .025 MG/1; MG/1
TABLET ORAL
COMMUNITY

## 2021-12-08 RX ORDER — SEMAGLUTIDE 1.34 MG/ML
INJECTION, SOLUTION SUBCUTANEOUS
COMMUNITY
Start: 2021-12-02

## 2021-12-08 NOTE — PROGRESS NOTES
"Chief Complaint  Follow-up    Subjective          Ramirez Fernandez presents to [facility] PULMONARY & CRITICAL CARE MEDICINE  History of Present Illness     Mr. Fernandez is a 66 year old male with a medical history significant for allergies, asthma, hyperlipidemia, hypertension, idiopathic pulmonary fibrosis, and diabetes.    He presents today for routine follow-up on idiopathic pulmonary fibrosis.  He states that he has been doing well since his last visit.  He reports starting allergy injections which have significantly helped his breathing as well.  He reports shortness of breath has been at baseline.            Objective   Vital Signs:   /74 (BP Location: Right arm, Patient Position: Sitting, Cuff Size: Adult)   Pulse 64   Temp 97.8 °F (36.6 °C) (Temporal)   Ht 195.6 cm (77\")   Wt 111 kg (245 lb)   SpO2 96%   BMI 29.05 kg/m²     Physical Exam     GENERAL APPEARANCE: Well developed, well nourished, alert and cooperative, and appears to be in no acute distress.    HEAD: normocephalic. Atraumatic.    EYES: PERRL, EOMI. Vision is grossly intact.    THROAT: Oral cavity and pharynx normal. No inflammation, swelling, exudate, or lesions.     NECK: Neck supple.  No thyromegaly.    CARDIAC: Normal S1 and S2. No S3, S4 or murmurs. Rhythm is regular.     RESPIRATORY:Bilateral air entry positive. Bilateral diminished breath sounds. No wheezing, crackles or rhonchi noted.    GI: Positive bowel sounds. Soft, nondistended, nontender.     MUSCULOSKELETAL: No significant deformity or joint abnormality. No edema. Peripheral pulses intact. No varicosities.    NEUROLOGICAL: Strength and sensation symmetric and intact throughout.     PSYCHIATRIC: The mental examination revealed the patient was oriented to person, place, and time.     Result Review :   The following data was reviewed by: DUNCAN Arana on 12/08/2021:               Assessment and Plan    Diagnoses and all orders for this " visit:    1. Idiopathic pulmonary fibrosis (HCC) (Primary)  -     Home Nebulizer    Other orders  -     albuterol (PROVENTIL) (2.5 MG/3ML) 0.083% nebulizer solution; Take 2.5 mg by nebulization 4 (Four) Times a Day As Needed for Wheezing.  Dispense: 360 mL; Refill: 5          Pulmonary fibrosis:  -Continue Ofev.  -Continue supplemental oxygen at nighttime as needed.  -Continue albuterol every 4 hours as needed.  -We will send prescription for albuterol for the nebulizer as well as a nebulizer order.    Patient's Body mass index is 29.05 kg/m². indicating that he is overweight (BMI 25-29.9). Obesity-related health conditions include the following: hypertension, diabetes mellitus and dyslipidemias. Obesity is unchanged. BMI is is above average; BMI management plan is completed. We discussed portion control and increasing exercise..          Follow Up   Return in about 6 months (around 6/8/2022).  Patient was given instructions and counseling regarding his condition or for health maintenance advice. Please see specific information pulled into the AVS if appropriate.

## 2022-06-01 ENCOUNTER — HOSPITAL ENCOUNTER (OUTPATIENT)
Dept: GENERAL RADIOLOGY | Facility: HOSPITAL | Age: 67
Discharge: HOME | End: 2022-06-01
Admitting: NURSE PRACTITIONER

## 2022-06-01 ENCOUNTER — OFFICE VISIT (OUTPATIENT)
Dept: PULMONOLOGY | Facility: CLINIC | Age: 67
End: 2022-06-01

## 2022-06-01 VITALS
HEIGHT: 77 IN | TEMPERATURE: 97.3 F | DIASTOLIC BLOOD PRESSURE: 86 MMHG | SYSTOLIC BLOOD PRESSURE: 142 MMHG | WEIGHT: 245 LBS | HEART RATE: 82 BPM | OXYGEN SATURATION: 98 % | BODY MASS INDEX: 28.93 KG/M2

## 2022-06-01 DIAGNOSIS — J84.112 IDIOPATHIC PULMONARY FIBROSIS: ICD-10-CM

## 2022-06-01 DIAGNOSIS — E66.3 OVERWEIGHT: ICD-10-CM

## 2022-06-01 DIAGNOSIS — J84.112 IDIOPATHIC PULMONARY FIBROSIS: Primary | ICD-10-CM

## 2022-06-01 PROCEDURE — 99213 OFFICE O/P EST LOW 20 MIN: CPT | Performed by: NURSE PRACTITIONER

## 2022-06-01 PROCEDURE — 71046 X-RAY EXAM CHEST 2 VIEWS: CPT | Performed by: RADIOLOGY

## 2022-06-01 PROCEDURE — 71046 X-RAY EXAM CHEST 2 VIEWS: CPT

## 2022-06-01 RX ORDER — CARVEDILOL 6.25 MG/1
6.25 TABLET ORAL 2 TIMES DAILY
COMMUNITY
Start: 2022-05-26

## 2022-06-01 NOTE — PROGRESS NOTES
"Chief Complaint  Idiopathic pulmonary fibrosis    Subjective        Ramirez Fernandez presents to [facility] PULMONARY & CRITICAL CARE MEDICINE  History of Present Illness     Mr. Fernandez is a 66 year old male with a medical history significant for idiopathic pulmonary fibrosis, hyperlipidemia, hypertension, Raynaud's disease and diabetes.    He presents today for routine follow-up on idiopathic pulmonary fibrosis.  He reports that for the most part his shortness of breath has been at baseline.  He states that he has had several colds since his last visit and that he feels deconditioned due to lack of exercise.  He is currently on Ofev twice daily.  He also uses albuterol as needed.  He is compliant with supplemental oxygen use at nighttime and as needed.      Objective   Vital Signs:  /86   Pulse 82   Temp 97.3 °F (36.3 °C) (Temporal)   Ht 195.6 cm (77\")   Wt 111 kg (245 lb)   SpO2 98%   BMI 29.05 kg/m²     BMI is >= 25 and < 30. (Overweight) The following options were offered after discussion: exercise counseling/recommendations and nutrition counseling/recommendations      Physical Exam     GENERAL APPEARANCE: Well developed, well nourished, alert and cooperative, and appears to be in no acute distress.    HEAD: normocephalic. Atraumatic.    EYES: PERRL, EOMI. Vision is grossly intact.    THROAT: Oral cavity and pharynx normal. No inflammation, swelling, exudate, or lesions.     NECK: Neck supple.  No thyromegaly.    CARDIAC: Normal S1 and S2. No S3, S4 or murmurs. Rhythm is regular.     RESPIRATORY:Bilateral air entry positive. Bilateral diminished breath sounds. No wheezing, crackles or rhonchi noted.    GI: Positive bowel sounds. Soft, nondistended, nontender.     MUSCULOSKELETAL: No significant deformity or joint abnormality. No edema. Peripheral pulses intact. No varicosities.    NEUROLOGICAL: Strength and sensation symmetric and intact throughout.     PSYCHIATRIC: The mental " examination revealed the patient was oriented to person, place, and time.     Result Review :  The following data was reviewed by: DUNCAN Arana on 06/01/2022:               Assessment and Plan   Diagnoses and all orders for this visit:    1. Idiopathic pulmonary fibrosis (HCC) (Primary)  -     XR Chest 2 View; Future    2. Overweight               Pulmonary fibrosis:  Continue Ofev twice daily.  Continue supplemental oxygen at nighttime and as needed.  Continue albuterol every 4 hours as needed.  Ordered chest x-ray.    Follow Up   Return in about 6 months (around 12/1/2022).  Patient was given instructions and counseling regarding his condition or for health maintenance advice. Please see specific information pulled into the AVS if appropriate.

## 2022-06-03 ENCOUNTER — TELEPHONE (OUTPATIENT)
Dept: PULMONOLOGY | Facility: CLINIC | Age: 67
End: 2022-06-03

## 2023-01-12 ENCOUNTER — OFFICE VISIT (OUTPATIENT)
Dept: PULMONOLOGY | Facility: CLINIC | Age: 68
End: 2023-01-12
Payer: MEDICARE

## 2023-01-12 VITALS
HEART RATE: 88 BPM | TEMPERATURE: 96.9 F | WEIGHT: 245 LBS | HEIGHT: 77 IN | BODY MASS INDEX: 28.93 KG/M2 | OXYGEN SATURATION: 97 % | SYSTOLIC BLOOD PRESSURE: 130 MMHG | DIASTOLIC BLOOD PRESSURE: 84 MMHG

## 2023-01-12 DIAGNOSIS — E66.3 OVERWEIGHT: ICD-10-CM

## 2023-01-12 DIAGNOSIS — R09.02 HYPOXIA: ICD-10-CM

## 2023-01-12 DIAGNOSIS — J84.112 IDIOPATHIC PULMONARY FIBROSIS: Primary | ICD-10-CM

## 2023-01-12 PROCEDURE — 99214 OFFICE O/P EST MOD 30 MIN: CPT | Performed by: NURSE PRACTITIONER

## 2023-01-12 RX ORDER — ALBUTEROL SULFATE 90 UG/1
2 AEROSOL, METERED RESPIRATORY (INHALATION) EVERY 4 HOURS PRN
Qty: 18 G | Refills: 5 | Status: SHIPPED | OUTPATIENT
Start: 2023-01-12

## 2023-01-12 NOTE — PROGRESS NOTES
"Chief Complaint  Idiopathis pulmonary fibrosis    Subjective        Ramirez Fernandez presents to [facility] PULMONARY & CRITICAL CARE MEDICINE  History of Present Illness     Mr. Martinez is a 67 year old male with a medical history significant for hypertension, hyperlipidemia, diabetes, and IPF.    He presents today for routine follow up on IPF.  He states that he has been doing well since his last visit. He reports that his breathing has been at baseline.  He continues to take OFEV BID.  He uses albuterol as needed.  He is also complaint with supplemental oxygen use as needed.     Objective   Vital Signs:  /84   Pulse 88   Temp 96.9 °F (36.1 °C) (Temporal)   Ht 195.6 cm (77\")   Wt 111 kg (245 lb)   SpO2 97%   BMI 29.05 kg/m²   Estimated body mass index is 29.05 kg/m² as calculated from the following:    Height as of this encounter: 195.6 cm (77\").    Weight as of this encounter: 111 kg (245 lb).       BMI is >= 25 and <30. (Overweight) The following options were offered after discussion;: exercise counseling/recommendations and nutrition counseling/recommendations      Physical Exam     GENERAL APPEARANCE: Well developed, well nourished, alert and cooperative, and appears to be in no acute distress.    HEAD: normocephalic. Atraumatic.    EYES: PERRL, EOMI. Vision is grossly intact.    THROAT: Oral cavity and pharynx normal. No inflammation, swelling, exudate, or lesions.     NECK: Neck supple.  No thyromegaly.    CARDIAC: Normal S1 and S2. No S3, S4 or murmurs. Rhythm is regular.     RESPIRATORY:Bilateral air entry positive. Bilateral diminished breath sounds. Bilateral crackles noted in the bases.    GI: Positive bowel sounds. Soft, nondistended, nontender.     MUSCULOSKELETAL: No significant deformity or joint abnormality. No edema. Peripheral pulses intact. No varicosities.    NEUROLOGICAL: Strength and sensation symmetric and intact throughout.     PSYCHIATRIC: The mental " examination revealed the patient was oriented to person, place, and time.     Result Review :  The following data was reviewed by: DUNCAN Arana on 01/12/2023:                   Assessment and Plan   Diagnoses and all orders for this visit:    1. Idiopathic pulmonary fibrosis (HCC) (Primary)  -     XR Chest 2 View; Future    2. Overweight    3. Hypoxia    Other orders  -     albuterol sulfate  (90 Base) MCG/ACT inhaler; Inhale 2 puffs Every 4 (Four) Hours As Needed for Wheezing.  Dispense: 18 g; Refill: 5          Continue OFEV BID.  Continue albuterol as needed. Sent refill to pharmacy.    Complaint with supplemental oxygen as needed.    Chest xray ordered to be done before next visit in 6 months.            Follow Up   Return in about 6 months (around 7/12/2023).  Patient was given instructions and counseling regarding his condition or for health maintenance advice. Please see specific information pulled into the AVS if appropriate.

## 2024-01-11 ENCOUNTER — OFFICE VISIT (OUTPATIENT)
Dept: PULMONOLOGY | Facility: CLINIC | Age: 69
End: 2024-01-11
Payer: MEDICARE

## 2024-01-11 VITALS
TEMPERATURE: 97.7 F | OXYGEN SATURATION: 99 % | DIASTOLIC BLOOD PRESSURE: 86 MMHG | BODY MASS INDEX: 28.86 KG/M2 | HEART RATE: 71 BPM | WEIGHT: 244.4 LBS | SYSTOLIC BLOOD PRESSURE: 140 MMHG | HEIGHT: 77 IN

## 2024-01-11 DIAGNOSIS — R09.02 HYPOXIA: ICD-10-CM

## 2024-01-11 DIAGNOSIS — J84.112 IDIOPATHIC PULMONARY FIBROSIS: Primary | ICD-10-CM

## 2024-01-11 DIAGNOSIS — E66.3 OVERWEIGHT: ICD-10-CM

## 2024-01-11 LAB
FEV1/FVC: 64 %
FEV1: 1.29 LITERS
FVC VOL RESPIRATORY: 2.03 LITERS

## 2024-01-11 ASSESSMENT — PULMONARY FUNCTION TESTS
FEV1: 1.29
FVC: 2.03
FEV1/FVC: 64

## 2024-01-11 NOTE — PROGRESS NOTES
"Chief Complaint  Idiopathic pulmonary fibrosis    Subjective        Ramirez Fernandez presents to [facility] PULMONARY & CRITICAL CARE MEDICINE  History of Present Illness    Mr. Fernandez is a 68 year old male with a medical history significant for asthma, hyperlipidemia, hypertension, idiopathic pulmonary fibrosis, Raynaud's disease and diabetes.    He presents today for follow up on IPF.  He states that he has been doing well since his last visit. He reports that his breathing has been at baseline.  He continues to take OFEV.  He is also using albuterol nebs and inhaler as needed.  He is using supplemental oxygen as needed.        Objective   Vital Signs:  /86   Pulse 71   Temp 97.7 °F (36.5 °C)   Ht 195.6 cm (77.01\")   Wt 111 kg (244 lb 6.4 oz)   SpO2 99%   BMI 28.98 kg/m²   Estimated body mass index is 28.98 kg/m² as calculated from the following:    Height as of this encounter: 195.6 cm (77.01\").    Weight as of this encounter: 111 kg (244 lb 6.4 oz).               Physical Exam     GENERAL APPEARANCE: Well developed, well nourished, alert and cooperative, and appears to be in no acute distress.    HEAD: normocephalic. Atraumatic.    EYES: PERRL, EOMI. Vision is grossly intact.    THROAT: Oral cavity and pharynx normal. No inflammation, swelling, exudate, or lesions.     NECK: Neck supple.  No thyromegaly.    CARDIAC: Normal S1 and S2. No S3, S4 or murmurs. Rhythm is regular.     RESPIRATORY:Bilateral air entry positive. Bilateral diminished breath sounds. No wheezing, crackles or rhonchi noted.    GI: Positive bowel sounds. Soft, nondistended, nontender.     MUSCULOSKELETAL: No significant deformity or joint abnormality. No edema. Peripheral pulses intact. No varicosities.    NEUROLOGICAL: Strength and sensation symmetric and intact throughout.     PSYCHIATRIC: The mental examination revealed the patient was oriented to person, place, and time.     Result Review :  The following " data was reviewed by: DUNCAN Arana on 01/11/2024:                 Assessment and Plan   Diagnoses and all orders for this visit:    1. Idiopathic pulmonary fibrosis (Primary)    2. Overweight    3. Hypoxia        Continue OFEV BID.  Continue albuterol as needed.  Continue neb treatments as needed.    He is compliant with supplemental oxygen use as needed.    Spirometry done in office.  FEV1/FVC was noted to be 64. FEV1 31%.  Restriction is also noted.    Will plan to obtain chest xray prior to next visit.      Follow Up   Return in about 6 months (around 7/11/2024).  Patient was given instructions and counseling regarding his condition or for health maintenance advice. Please see specific information pulled into the AVS if appropriate.

## 2024-07-09 DIAGNOSIS — J84.112 IDIOPATHIC PULMONARY FIBROSIS: Primary | ICD-10-CM

## 2024-07-11 ENCOUNTER — OFFICE VISIT (OUTPATIENT)
Dept: PULMONOLOGY | Facility: CLINIC | Age: 69
End: 2024-07-11
Payer: MEDICARE

## 2024-07-11 ENCOUNTER — HOSPITAL ENCOUNTER (OUTPATIENT)
Dept: GENERAL RADIOLOGY | Facility: HOSPITAL | Age: 69
Discharge: HOME | End: 2024-07-11
Admitting: NURSE PRACTITIONER
Payer: MEDICARE

## 2024-07-11 VITALS
DIASTOLIC BLOOD PRESSURE: 78 MMHG | HEIGHT: 77 IN | HEART RATE: 100 BPM | WEIGHT: 240.6 LBS | SYSTOLIC BLOOD PRESSURE: 138 MMHG | TEMPERATURE: 97.8 F | OXYGEN SATURATION: 93 % | BODY MASS INDEX: 28.41 KG/M2

## 2024-07-11 DIAGNOSIS — E66.3 OVERWEIGHT: ICD-10-CM

## 2024-07-11 DIAGNOSIS — J84.112 IDIOPATHIC PULMONARY FIBROSIS: Primary | ICD-10-CM

## 2024-07-11 DIAGNOSIS — J84.112 IDIOPATHIC PULMONARY FIBROSIS: ICD-10-CM

## 2024-07-11 DIAGNOSIS — R09.02 HYPOXIA: ICD-10-CM

## 2024-07-11 PROCEDURE — 71046 X-RAY EXAM CHEST 2 VIEWS: CPT

## 2024-07-11 RX ORDER — FLUTICASONE PROPIONATE 50 MCG
2 SPRAY, SUSPENSION (ML) NASAL DAILY
Qty: 16 G | Refills: 5 | Status: SHIPPED | OUTPATIENT
Start: 2024-07-11

## 2024-07-11 NOTE — PROGRESS NOTES
"Chief Complaint  Idiopathic pulmonary fibrosis    Subjective        Ramirez Fernandez presents to [facility] PULMONARY & CRITICAL CARE MEDICINE  History of Present Illness    Mr. Fernandez is a 68 year old male with a medical history significant for asthma, hyperlipidemia, hypertension, IPF, diabetes, and raynaud's disease.    He presents today for follow up on IPF.   He reports that he has been doing well since his last visit.  He reports that his shortness of breath and cough are at baseline.  He is taking OFEV BID and using albuterol and neb treatments as needed.  He is complaint with supplemental oxygen as needed.      Objective   Vital Signs:  /78   Pulse 100   Temp 97.8 °F (36.6 °C)   Ht 195.6 cm (77.01\")   Wt 109 kg (240 lb 9.6 oz)   SpO2 93%   BMI 28.53 kg/m²   Estimated body mass index is 28.53 kg/m² as calculated from the following:    Height as of this encounter: 195.6 cm (77.01\").    Weight as of this encounter: 109 kg (240 lb 9.6 oz).       BMI is >= 25 and <30. (Overweight) The following options were offered after discussion;: exercise counseling/recommendations and nutrition counseling/recommendations      Physical Exam     GENERAL APPEARANCE: Well developed, well nourished, alert and cooperative, and appears to be in no acute distress.    HEAD: normocephalic. Atraumatic.    EYES: PERRL, EOMI. Vision is grossly intact.    THROAT: Oral cavity and pharynx normal. No inflammation, swelling, exudate, or lesions.     NECK: Neck supple.  No thyromegaly.    CARDIAC: Normal S1 and S2. No S3, S4 or murmurs. Rhythm is regular.     RESPIRATORY:Bilateral air entry positive. Bilateral diminished breath sounds. No wheezing, crackles or rhonchi noted.    GI: Positive bowel sounds. Soft, nondistended, nontender.     MUSCULOSKELETAL: No significant deformity or joint abnormality. No edema. Peripheral pulses intact. No varicosities.    NEUROLOGICAL: Strength and sensation symmetric and " intact throughout.     PSYCHIATRIC: The mental examination revealed the patient was oriented to person, place, and time.     Result Review :    The following data was reviewed by: DUNCAN Arana on 07/11/2024:                 Assessment and Plan     Diagnoses and all orders for this visit:    1. Idiopathic pulmonary fibrosis (Primary)    2. Overweight    3. Hypoxia    Other orders  -     fluticasone (FLONASE) 50 MCG/ACT nasal spray; 2 sprays into the nostril(s) as directed by provider Daily.  Dispense: 16 g; Refill: 5        Continue OFEV BID.  Continue albuterol inhaler as needed.  Continue neb treatments as needed.    He is complaint with use of supplemental oxygen as needed.    Chest xray was reviewed.  No change or progression noted.    We discussed diet and exercise.       Follow Up     Return in about 6 months (around 1/11/2025).  Patient was given instructions and counseling regarding his condition or for health maintenance advice. Please see specific information pulled into the AVS if appropriate.

## 2025-01-15 ENCOUNTER — OFFICE VISIT (OUTPATIENT)
Dept: PULMONOLOGY | Facility: CLINIC | Age: 70
End: 2025-01-15
Payer: MEDICARE

## 2025-01-15 VITALS
HEIGHT: 77 IN | BODY MASS INDEX: 27.75 KG/M2 | SYSTOLIC BLOOD PRESSURE: 142 MMHG | OXYGEN SATURATION: 99 % | WEIGHT: 235 LBS | HEART RATE: 88 BPM | DIASTOLIC BLOOD PRESSURE: 78 MMHG | TEMPERATURE: 96.9 F

## 2025-01-15 DIAGNOSIS — R09.02 HYPOXIA: ICD-10-CM

## 2025-01-15 DIAGNOSIS — J84.112 IDIOPATHIC PULMONARY FIBROSIS: Primary | ICD-10-CM

## 2025-01-15 RX ORDER — AZELASTINE 1 MG/ML
2 SPRAY, METERED NASAL 2 TIMES DAILY
COMMUNITY

## 2025-01-15 NOTE — PROGRESS NOTES
"Chief Complaint  Idiopathic pulmonary fibrosis    Subjective          Ramirez Fernandez presents to [facility]ICINE for   History of Present Illness    Mr. Fernandez is a 69 year old male with a medical history significant for asthma, hyperlipidemia, hypertension, IPF, and diabetes.    He presents today for follow up on IPF.  He reports that he has been doing well.  He reports that his breathing has been at baseline.  He states that he has had a cough but that he started using azelastine nasal spray again and his cough has improved. He continues to take OFEV and use albuterol inhaler and neb treatments as needed.  He is also using supplemental oxygen at night and as needed.        Objective   Vital Signs:   /78   Pulse 88   Temp 96.9 °F (36.1 °C)   Ht 195.6 cm (77.01\")   Wt 107 kg (235 lb)   SpO2 99%   BMI 27.86 kg/m²         Physical Exam    GENERAL APPEARANCE: Well developed, well nourished, alert and cooperative, and appears to be in no acute distress.    HEAD: normocephalic. Atraumatic.    EYES: PERRL, EOMI. Vision is grossly intact.    THROAT: Oral cavity and pharynx normal. No inflammation, swelling, exudate, or lesions.     NECK: Neck supple.  No thyromegaly.    CARDIAC: Normal S1 and S2. No S3, S4 or murmurs. Rhythm is regular.     RESPIRATORY:Bilateral air entry positive. Bilateral crackles noted in the bases.    GI: Positive bowel sounds. Soft, nondistended, nontender.     MUSCULOSKELETAL: No significant deformity or joint abnormality. No edema. Peripheral pulses intact. No varicosities.    NEUROLOGICAL: Strength and sensation symmetric and intact throughout.     PSYCHIATRIC: The mental examination revealed the patient was oriented to person, place, and time.       Estimated body mass index is 27.86 kg/m² as calculated from the following:    Height as of this encounter: 195.6 cm (77.01\").    Weight as of this encounter: 107 kg (235 " "lb).        Result Review :   The following data was reviewed by: DUNCAN Arana on 01/15/2025:         PFT:1/11/24        Low dose lung cancer screening:NA    Previous chest imaging:    Chest xray:  7/11/24  FINDINGS:    LUNGS AND PLEURAL SPACES:  Coarsened interstitial markings noted  throughout the lungs.  Probable calcified nodule of left upper lobe  again noted.  Atelectasis in the lung bases.  No pneumothorax.    HEART:  Unremarkable as visualized.  No cardiomegaly.    MEDIASTINUM:  Unremarkable as visualized.  Normal mediastinal contour.    BONES/JOINTS:  Unremarkable as visualized.  No acute fracture.     IMPRESSION:    Coarsened interstitial markings noted throughout the lungs.        This report was finalized on 7/11/2024 8:51 AM by Dr. Trever Godoy MD.      Alpha-1 antitrypsin screening:NA    STOP-Bang Score:   NA  [place] Sleepiness Scale:   NA      ABG:    pH No results found for: \"PHART\"   pO2 No results found for: \"PO2ART\"   pCO2 No results found for: \"WDT1PCC\"   HCO3 No results found for: \"[id number]\"                      Assessment and Plan    Problem List Items Addressed This Visit    None  Visit Diagnoses       Idiopathic pulmonary fibrosis    -  Primary    Relevant Medications    azelastine (ASTELIN) 0.1 % nasal spray    Hypoxia                Ramirez RAH Fernandez  reports that he has never smoked. He has never been exposed to tobacco smoke. He has never used smokeless tobacco.     Continue OFEV BID.  Continue albuterol inhaler as needed.  Continue neb treatments as needed.    He is complaint with use of supplemental oxygen at night and as needed.    Will plan to do a chest xray prior to his next visit.        Follow Up   Return in about 6 months (around 7/15/2025).  Patient was given instructions and counseling regarding his condition or for health maintenance advice. Please see specific information pulled into the AVS if appropriate.        "

## 2025-07-15 ENCOUNTER — HOSPITAL ENCOUNTER (OUTPATIENT)
Dept: GENERAL RADIOLOGY | Facility: HOSPITAL | Age: 70
Discharge: HOME | End: 2025-07-15
Admitting: NURSE PRACTITIONER
Payer: MEDICARE

## 2025-07-15 ENCOUNTER — OFFICE VISIT (OUTPATIENT)
Dept: PULMONOLOGY | Facility: CLINIC | Age: 70
End: 2025-07-15
Payer: MEDICARE

## 2025-07-15 VITALS
HEART RATE: 88 BPM | HEIGHT: 77 IN | DIASTOLIC BLOOD PRESSURE: 80 MMHG | TEMPERATURE: 97.6 F | OXYGEN SATURATION: 94 % | BODY MASS INDEX: 28.01 KG/M2 | SYSTOLIC BLOOD PRESSURE: 122 MMHG | WEIGHT: 237.2 LBS

## 2025-07-15 DIAGNOSIS — R09.02 HYPOXIA: Primary | ICD-10-CM

## 2025-07-15 DIAGNOSIS — J84.112 IDIOPATHIC PULMONARY FIBROSIS: ICD-10-CM

## 2025-07-15 DIAGNOSIS — R09.02 HYPOXIA: ICD-10-CM

## 2025-07-15 DIAGNOSIS — J84.112 IDIOPATHIC PULMONARY FIBROSIS: Primary | ICD-10-CM

## 2025-07-15 PROCEDURE — 71046 X-RAY EXAM CHEST 2 VIEWS: CPT | Performed by: RADIOLOGY

## 2025-07-15 PROCEDURE — 71046 X-RAY EXAM CHEST 2 VIEWS: CPT

## 2025-07-15 NOTE — PROGRESS NOTES
"Chief Complaint  Idiopathic pulmonary fibrosis    Subjective          Ramirez Fernandez presents to [facility]IT[facility]ARE MEDICINE for   History of Present Illness    Mr. Fernandez is a 69 year old male with a medical history significant for asthma, hyperlipidemia, hypertension, pulmonary fibrosis, raynaud's disease and diabetes.    He presents today for follow up  on idiopathic pulmonary fibrosis.  He reports that his breathing has been at baseline and he is doing well.  He is currently taking OFEV and reports no side effects. He is tolerating his well. He also has albuterol inhaler and neb treatments to use as needed.  He has supplemental oxygen that he is using as needed.    Objective   Vital Signs:   /80   Pulse 88   Temp 97.6 °F (36.4 °C)   Ht 195.6 cm (77.01\")   Wt 108 kg (237 lb 3.2 oz)   SpO2 94%   BMI 28.12 kg/m²         Physical Exam    GENERAL APPEARANCE: Well developed, well nourished, alert and cooperative, and appears to be in no acute distress.    HEAD: normocephalic. Atraumatic.    EYES: PERRL, EOMI. Vision is grossly intact.    THROAT: Oral cavity and pharynx normal. No inflammation, swelling, exudate, or lesions.     NECK: Neck supple.  No thyromegaly.    CARDIAC: Normal S1 and S2. No S3, S4 or murmurs. Rhythm is regular.     RESPIRATORY:Bilateral air entry positive. Bilateral crackles in the bases.    GI: Positive bowel sounds. Soft, nondistended, nontender.     MUSCULOSKELETAL: No significant deformity or joint abnormality. No edema. Peripheral pulses intact. No varicosities.    NEUROLOGICAL: Strength and sensation symmetric and intact throughout.     PSYCHIATRIC: The mental examination revealed the patient was oriented to person, place, and time.       Estimated body mass index is 28.12 kg/m² as calculated from the following:    Height as of this encounter: 195.6 cm (77.01\").    Weight as of this encounter: 108 kg (237 lb 3.2 oz).        Result Review " ":   The following data was reviewed by: DUNCAN Arana on 07/15/2025:         PFT:1/11/24        Low dose lung cancer screening:NA    Previous chest imaging:    Chest xray 7/15/25  FINDINGS:  LUNGS: Lungs are adequately aerated.      HEART AND MEDIASTINUM: Heart and mediastinal contours are unremarkable        SKELETON: Bony and soft tissue structures are unremarkable.           IMPRESSION:  Bibasilar atelectasis        This report was finalized on 7/15/2025 8:40 AM by Dr. Teofilo Valencia MD.      Alpha-1 antitrypsin screening:NA    STOP-Bang Score:   NA  [place] Sleepiness Scale:   NA      ABG:    pH No results found for: \"PHART\"   pO2 No results found for: \"PO2ART\"   pCO2 No results found for: \"AFU0XDY\"   HCO3 No results found for: \"ZIH6EON\"                      Assessment and Plan    Problem List Items Addressed This Visit    None  Visit Diagnoses         Idiopathic pulmonary fibrosis    -  Primary      Hypoxia                Ramirez MONREAL Marcinjay  reports that he has never smoked. He has never been exposed to tobacco smoke. He has never used smokeless tobacco.     Continue OFEV BID. He is tolerating this well.  Continue albuterol inhaler as needed.  Continue neb treatments as needed.    He is compliant with use of supplemental oxygen at night and as needed.  Chest xray was reviewed.        Follow Up   Return in about 6 months (around 1/15/2026).  Patient was given instructions and counseling regarding his condition or for health maintenance advice. Please see specific information pulled into the AVS if appropriate.        "